# Patient Record
Sex: FEMALE | Race: WHITE | NOT HISPANIC OR LATINO | Employment: UNEMPLOYED | URBAN - METROPOLITAN AREA
[De-identification: names, ages, dates, MRNs, and addresses within clinical notes are randomized per-mention and may not be internally consistent; named-entity substitution may affect disease eponyms.]

---

## 2017-01-20 ENCOUNTER — COMMUNICATION - HEALTHEAST (OUTPATIENT)
Dept: BEHAVIORAL HEALTH | Facility: CLINIC | Age: 37
End: 2017-01-20

## 2017-01-20 DIAGNOSIS — F39 UNSPECIFIED EPISODIC MOOD DISORDER: ICD-10-CM

## 2017-04-20 ENCOUNTER — OFFICE VISIT - HEALTHEAST (OUTPATIENT)
Dept: BEHAVIORAL HEALTH | Facility: CLINIC | Age: 37
End: 2017-04-20

## 2017-04-20 DIAGNOSIS — Z86.59 H/O BORDERLINE PERSONALITY DISORDER: ICD-10-CM

## 2017-04-20 DIAGNOSIS — Z86.59 HISTORY OF POSTTRAUMATIC STRESS DISORDER (PTSD): ICD-10-CM

## 2017-04-20 DIAGNOSIS — F39 UNSPECIFIED EPISODIC MOOD DISORDER: ICD-10-CM

## 2017-04-20 DIAGNOSIS — E55.9 VITAMIN D DEFICIENCY: ICD-10-CM

## 2017-04-20 ASSESSMENT — MIFFLIN-ST. JEOR: SCORE: 2157.33

## 2017-06-04 ENCOUNTER — COMMUNICATION - HEALTHEAST (OUTPATIENT)
Dept: FAMILY MEDICINE | Facility: CLINIC | Age: 37
End: 2017-06-04

## 2017-06-23 ENCOUNTER — HOSPITAL ENCOUNTER (EMERGENCY)
Facility: CLINIC | Age: 37
Discharge: HOME | End: 2017-06-23
Attending: INTERNAL MEDICINE | Admitting: INTERNAL MEDICINE
Payer: COMMERCIAL

## 2017-06-23 VITALS
RESPIRATION RATE: 16 BRPM | WEIGHT: 293 LBS | HEART RATE: 92 BPM | SYSTOLIC BLOOD PRESSURE: 153 MMHG | TEMPERATURE: 98.8 F | BODY MASS INDEX: 55.33 KG/M2 | DIASTOLIC BLOOD PRESSURE: 97 MMHG | OXYGEN SATURATION: 99 %

## 2017-06-23 DIAGNOSIS — H10.33 ACUTE CONJUNCTIVITIS OF BOTH EYES, UNSPECIFIED ACUTE CONJUNCTIVITIS TYPE: ICD-10-CM

## 2017-06-23 PROCEDURE — 99282 EMERGENCY DEPT VISIT SF MDM: CPT | Performed by: INTERNAL MEDICINE

## 2017-06-23 PROCEDURE — 99283 EMERGENCY DEPT VISIT LOW MDM: CPT | Mod: Z6 | Performed by: INTERNAL MEDICINE

## 2017-06-23 RX ORDER — OFLOXACIN 3 MG/ML
1 SOLUTION/ DROPS OPHTHALMIC EVERY 4 HOURS
Qty: 1 BOTTLE | Refills: 0 | Status: SHIPPED | OUTPATIENT
Start: 2017-06-23 | End: 2022-03-31

## 2017-06-23 RX ORDER — PROPARACAINE HYDROCHLORIDE 5 MG/ML
1 SOLUTION/ DROPS OPHTHALMIC ONCE
Status: DISCONTINUED | OUTPATIENT
Start: 2017-06-23 | End: 2017-06-24 | Stop reason: HOSPADM

## 2017-06-23 ASSESSMENT — ENCOUNTER SYMPTOMS
EYE DISCHARGE: 1
ABDOMINAL PAIN: 0
COUGH: 0
EYE REDNESS: 1
FEVER: 0
EYE ITCHING: 0
EYE PAIN: 1

## 2017-06-23 ASSESSMENT — VISUAL ACUITY
OD: 20/25
OS: 20/30

## 2017-06-23 NOTE — ED AVS SNAPSHOT
[facility], Emergency Department    [address]    [facility] [place] [place] [address]    Phone:  [phone]    Fax:  [phone]                                       Natalie Fritsch   MRN: [id number]    Department:  [facility], Emergency Department   Date of Visit:  6/23/2017           Patient Information     Date Of Birth          1980        Your diagnoses for this visit were:     Acute conjunctivitis of both eyes, unspecified acute conjunctivitis type        You were seen by Yoel Dixon MD.        Discharge Instructions         Conjunctivitis, Nonspecific    The membrane that covers the white part of your eye (the conjunctiva) is inflamed. Inflammation happens when your body responds to an injury, allergic reaction, infection, or illness. Symptoms of inflammation in the eye may include redness, irritation, itching, swelling, or burning. These symptoms should go away within the next 24 hours. Conjunctivitis may be related to a particle that was in your eye. If so, it may wash out with your tears or irrigation treatment. Being exposed to liquid chemicals or fumes may also cause this reaction.   Home care    Apply a cold pack (ice in a plastic bag, wrapped in a towel) over the eye for 20 minutes at a time. This will reduce pain.    Artificial tears may be prescribed to reduce irritation or redness.  These should be used 3 to 4 times a day.    You may use acetaminophen or ibuprofen to control pain, unless another medicine was prescribed.(Note: If you have chronic liver or kidney disease, or if you have ever had a stomach ulcer or gastrointestinal bleeding, talk with your healthcare provider before using these medicines.)  Follow-up care  Follow up with your healthcare provider, or as advised.  When to seek medical advice  Call your healthcare provider right away if any of these occur:    Increased eyelid swelling    Increased eye pain    Increased redness or drainage from the eye    Increased blurry  vision or increased sensitivity to light    Failure of normal vision to return within 24 to 48 hours  Date Last Reviewed: 6/14/2015 2000-2017 [employer]. [address] [place] [address], [place] [address]. All rights reserved. This information is not intended as a substitute for professional medical care. Always follow your healthcare professional's instructions.      Please make an appointment to follow up with Eye Clinic (phone: [phone]) in 5-10 days if not improving.     24 Hour Appointment Hotline       To make an appointment at any [facility], call [phone]). If you don't have a family doctor or clinic, we will help you find one. [facility] clinics are conveniently located to serve the needs of you and your family.             Review of your medicines      START taking        Dose / Directions Last dose taken    ofloxacin 0.3 % ophthalmic solution   Commonly known as:  OCUFLOX   Dose:  1 drop   Quantity:  1 Bottle        Place 1 drop into both eyes every 4 hours   Refills:  0          Our records show that you are taking the medicines listed below. If these are incorrect, please call your family doctor or clinic.        Dose / Directions Last dose taken    ARIPiprazole 1 MG/ML Soln solution   Commonly known as:  ABILIFY   Dose:  10 mg        Take 10 mg by mouth daily   Refills:  0        EFFEXOR PO   Dose:  150 mg        Take 150 mg by mouth 3 times daily   Refills:  0        levonorgestrel-ethinyl estradiol per tablet   Commonly known as:  ENPRESSE   Dose:  1 tablet        Take 1 tablet by mouth daily   Refills:  0                Prescriptions were sent or printed at these locations (1 Prescription)                   Other Prescriptions                Printed at Department/Unit printer (1 of 1)         ofloxacin (OCUFLOX) 0.3 % ophthalmic solution                Orders Needing Specimen Collection     None      Pending Results     No orders found from 6/21/2017 to  "2017.            Pending Culture Results     No orders found from 2017 to 2017.            Pending Results Instructions     If you had any lab results that were not finalized at the time of your Discharge, you can call the ED Lab Result RN at [phone]. You will be contacted by this team for any positive Lab results or changes in treatment. The nurses are available 7 days a week from 10A to 6:30P.  You can leave a message 24 hours per day and they will return your call.        Thank you for choosing [facility]       Thank you for choosing [facility] for your care. Our goal is always to provide you with excellent care. Hearing back from our patients is one way we can continue to improve our services. Please take a few minutes to complete the written survey that you may receive in the mail after you visit with us. Thank you!        [employer] Information     [employer] lets you send messages to your doctor, view your test results, renew your prescriptions, schedule appointments and more. To sign up, go to [email or web address] . Click on \"Log in\" on the left side of the screen, which will take you to the Welcome page. Then click on \"Sign up Now\" on the right side of the page.     You will be asked to enter the access code listed below, as well as some personal information. Please follow the directions to create your username and password.     Your access code is: [email or web address]  Expires: 2017 10:44 PM     Your access code will  in 90 days. If you need help or a new code, please call your [facility] clinic or [phone].        Care EveryWhere ID     This is your Care EveryWhere ID. This could be used by other organizations to access your [facility] medical records  [id number]        Equal Access to Services     SHAKIRA SHARP : Darlene Longoria, david bautista, anthony brambila. So [facility] [phone].    ATENCIÓN: Si larisa lima " a hills disposición servicios gratuitos de asistencia lingüística. Llmakayla al [phone].    We comply with applicable federal civil rights laws and [place] laws. We do not discriminate on the basis of race, color, national origin, age, disability sex, sexual orientation or gender identity.            After Visit Summary       This is your record. Keep this with you and show to your community pharmacist(s) and doctor(s) at your next visit.

## 2017-06-23 NOTE — ED AVS SNAPSHOT
[facility], [place], Emergency Department    [address]    [facility] [address]    Phone:  [phone]    Fax:  [phone]                                       Natalie Fritsch   MRN: [id number]    Department:  [facility], Emergency Department   Date of Visit:  6/23/2017           After Visit Summary Signature Page     I have received my discharge instructions, and my questions have been answered. I have discussed any challenges I see with this plan with the nurse or doctor.    ..........................................................................................................................................  Patient/Patient Representative Signature      ..........................................................................................................................................  Patient Representative Print Name and Relationship to Patient    ..................................................               ................................................  Date                                            Time    ..........................................................................................................................................  Reviewed by Signature/Title    ...................................................              ..............................................  Date                                                            Time

## 2017-06-23 NOTE — LETTER
[facility], [place], EMERGENCY DEPARTMENT  [address] [place] [address]  [phone]    2017    Natalie Fritsch  [address] [place] [address]  [phone] (home)     : 1980      To Whom it may concern:    Natalie Fritsch was seen in our Emergency Department today, 2017.  I expect her condition to improve over the next 1 days.  She may return to work/school when improved.    Sincerely,        Yoel Dixon MD

## 2017-06-24 NOTE — ED PROVIDER NOTES
"  History     Chief Complaint   Patient presents with     Eye Drainage     left eye has drainage     HPI  Natalie Fritsch is a 36 year old female who presents to the ED with left eye drainage. Patient states she recently had a s/p I&D of Bartholins abscess on 06/15/17 at [facility] and just finished her course of antibiotics. She states she has eye pain in both eyes, but it's worse in the left and has seen pus come out of her left eye. She also states she sees \"white swollen dots\" in her left eye. She states her vision is a bit cloudy. She otherwise denies wearing contacts, itchy eyes,  cough, fever, chest pain, or abdominal pain.     PAST MEDICAL HISTORY  History reviewed. No pertinent past medical history.  PAST SURGICAL HISTORY  History reviewed. No pertinent surgical history.  FAMILY HISTORY  Family History   Problem Relation Age of Onset     Substance Abuse Mother      MENTAL ILLNESS Mother      Substance Abuse Father      MENTAL ILLNESS Father      Anxiety Disorder Maternal Grandmother      Substance Abuse Paternal Grandfather      SOCIAL HISTORY  Social History   Substance Use Topics     Smoking status: Current Every Day Smoker     Packs/day: 1.00     Smokeless tobacco: Not on file     Alcohol use No      Comment: rarely     MEDICATIONS  Current Facility-Administered Medications   Medication     fluorescein ophthalmic strip 1 strip     proparacaine (ALCAINE) 0.5 % ophthalmic solution 1 drop     Current Outpatient Prescriptions   Medication     ofloxacin (OCUFLOX) 0.3 % ophthalmic solution     levonorgestrel-ethinyl estradiol (ENPRESSE) per tablet     Venlafaxine HCl (EFFEXOR PO)     ARIPiprazole (ABILIFY) 1 MG/ML SOLN     ALLERGIES  Allergies   Allergen Reactions     Penicillin V          I have reviewed the Medications, Allergies, Past Medical and Surgical History, and Social History in the Epic system.    Review of Systems   Constitutional: Negative for fever.   Eyes: Positive for pain (bilateral), " discharge (left) and redness (left). Negative for itching.   Respiratory: Negative for cough.    Cardiovascular: Negative for chest pain.   Gastrointestinal: Negative for abdominal pain.   All other systems reviewed and are negative.      Physical Exam   BP: (!) 153/97  Pulse: 92  Temp: 98.8  F (37.1  C)  Resp: 16  Weight: (!) 146.3 kg (322 lb 8 oz)  SpO2: 99 %  Physical Exam   Eyes: EOM and lids are normal. Pupils are equal, round, and reactive to light. Right conjunctiva is injected. Right conjunctiva has no hemorrhage. Left conjunctiva is injected. Left conjunctiva has no hemorrhage.   Slit lamp exam:       The right eye shows no corneal abrasion, no corneal flare, no corneal ulcer, no foreign body, no hyphema, no hypopyon and no fluorescein uptake.        The left eye shows no corneal abrasion, no corneal flare, no corneal ulcer, no foreign body, no hyphema, no hypopyon and no fluorescein uptake.       ED Course     ED Course     Procedures   10:07 PM  The patient was seen and examined by Zack Earl in Room 7.                  Labs Ordered and Resulted from Time of ED Arrival Up to the Time of Departure from the ED - No data to display         Assessments & Plan (with Medical Decision Making)  Acute conjunctivitis may possibly due to EKC with fever and URI sx, will stat ocuflox and follow up with Eye Clinic if no improvements.       I have reviewed the nursing notes.    I have reviewed the findings, diagnosis, plan and need for follow up with the patient.    Discharge Medication List as of 6/23/2017 11:15 PM      START taking these medications    Details   ofloxacin (OCUFLOX) 0.3 % ophthalmic solution Place 1 drop into both eyes every 4 hours, Disp-1 Bottle, R-0, Local Print             Final diagnoses:   Acute conjunctivitis of both eyes, unspecified acute conjunctivitis type     I, James Larkin am serving as a trained medical scribe to document services personally performed by Yoel Dixon MD, based on the  provider's statements to me.      I, Yoel Dixon MD, was physically present and have reviewed and verified the accuracy of this note documented by James Larkin.     6/23/2017   [facility], [place], EMERGENCY DEPARTMENT     Yoel Dixon MD  06/23/17 1187

## 2017-06-24 NOTE — DISCHARGE INSTRUCTIONS
Conjunctivitis, Nonspecific    The membrane that covers the white part of your eye (the conjunctiva) is inflamed. Inflammation happens when your body responds to an injury, allergic reaction, infection, or illness. Symptoms of inflammation in the eye may include redness, irritation, itching, swelling, or burning. These symptoms should go away within the next 24 hours. Conjunctivitis may be related to a particle that was in your eye. If so, it may wash out with your tears or irrigation treatment. Being exposed to liquid chemicals or fumes may also cause this reaction.   Home care    Apply a cold pack (ice in a plastic bag, wrapped in a towel) over the eye for 20 minutes at a time. This will reduce pain.    Artificial tears may be prescribed to reduce irritation or redness.  These should be used 3 to 4 times a day.    You may use acetaminophen or ibuprofen to control pain, unless another medicine was prescribed.(Note: If you have chronic liver or kidney disease, or if you have ever had a stomach ulcer or gastrointestinal bleeding, talk with your healthcare provider before using these medicines.)  Follow-up care  Follow up with your healthcare provider, or as advised.  When to seek medical advice  Call your healthcare provider right away if any of these occur:    Increased eyelid swelling    Increased eye pain    Increased redness or drainage from the eye    Increased blurry vision or increased sensitivity to light    Failure of normal vision to return within 24 to 48 hours  Date Last Reviewed: 6/14/2015 2000-2017 [employer]. [address] [place] [address]. All rights reserved. This information is not intended as a substitute for professional medical care. Always follow your healthcare professional's instructions.      Please make an appointment to follow up with Eye Clinic (phone: [phone]) in 5-10 days if not improving.

## 2017-08-27 ENCOUNTER — ANESTHESIA - HEALTHEAST (OUTPATIENT)
Dept: SURGERY | Facility: HOSPITAL | Age: 37
End: 2017-08-27

## 2017-08-27 ASSESSMENT — MIFFLIN-ST. JEOR
SCORE: 2025.79
SCORE: 2168.11

## 2017-08-28 ENCOUNTER — SURGERY - HEALTHEAST (OUTPATIENT)
Dept: SURGERY | Facility: HOSPITAL | Age: 37
End: 2017-08-28

## 2017-08-28 ASSESSMENT — MIFFLIN-ST. JEOR: SCORE: 2210.86

## 2017-09-12 ENCOUNTER — OFFICE VISIT - HEALTHEAST (OUTPATIENT)
Dept: SURGERY | Facility: CLINIC | Age: 37
End: 2017-09-12

## 2017-09-12 DIAGNOSIS — Z48.89 POSTOPERATIVE VISIT: ICD-10-CM

## 2017-10-19 ENCOUNTER — OFFICE VISIT - HEALTHEAST (OUTPATIENT)
Dept: BEHAVIORAL HEALTH | Facility: CLINIC | Age: 37
End: 2017-10-19

## 2017-10-19 DIAGNOSIS — G89.29 CHRONIC BILATERAL THORACIC BACK PAIN: ICD-10-CM

## 2017-10-19 DIAGNOSIS — F32.A DEPRESSION (EMOTION): ICD-10-CM

## 2017-10-19 DIAGNOSIS — E55.9 VITAMIN D DEFICIENCY: ICD-10-CM

## 2017-10-19 DIAGNOSIS — R63.8 INCREASED BMI: ICD-10-CM

## 2017-10-19 DIAGNOSIS — F43.0 ACUTE REACTION TO STRESS: ICD-10-CM

## 2017-10-19 DIAGNOSIS — Z86.59 HISTORY OF POSTTRAUMATIC STRESS DISORDER (PTSD): ICD-10-CM

## 2017-10-19 DIAGNOSIS — F41.1 ANXIETY STATE: ICD-10-CM

## 2017-10-19 DIAGNOSIS — M54.6 CHRONIC BILATERAL THORACIC BACK PAIN: ICD-10-CM

## 2017-10-19 ASSESSMENT — MIFFLIN-ST. JEOR: SCORE: 2193.62

## 2017-11-07 ENCOUNTER — COMMUNICATION - HEALTHEAST (OUTPATIENT)
Dept: FAMILY MEDICINE | Facility: CLINIC | Age: 37
End: 2017-11-07

## 2017-11-07 ENCOUNTER — COMMUNICATION - HEALTHEAST (OUTPATIENT)
Dept: BEHAVIORAL HEALTH | Facility: CLINIC | Age: 37
End: 2017-11-07

## 2017-11-07 DIAGNOSIS — F39 EPISODIC MOOD DISORDER (H): ICD-10-CM

## 2017-11-07 DIAGNOSIS — F41.1 ANXIETY STATE: ICD-10-CM

## 2017-11-27 ENCOUNTER — OFFICE VISIT - HEALTHEAST (OUTPATIENT)
Dept: FAMILY MEDICINE | Facility: CLINIC | Age: 37
End: 2017-11-27

## 2017-11-27 DIAGNOSIS — Z30.09 CONTRACEPTIVE USE EDUCATION: ICD-10-CM

## 2017-11-27 DIAGNOSIS — Z00.00 ANNUAL PHYSICAL EXAM: ICD-10-CM

## 2017-11-27 DIAGNOSIS — Z11.3 SCREENING FOR STD (SEXUALLY TRANSMITTED DISEASE): ICD-10-CM

## 2017-11-27 LAB — HIV 1+2 AB+HIV1 P24 AG SERPL QL IA: NEGATIVE

## 2017-11-27 ASSESSMENT — MIFFLIN-ST. JEOR: SCORE: 2237.5

## 2017-11-28 LAB — SYPHILIS RPR SCREEN - HISTORICAL: NORMAL

## 2017-12-18 ENCOUNTER — COMMUNICATION - HEALTHEAST (OUTPATIENT)
Dept: BEHAVIORAL HEALTH | Facility: CLINIC | Age: 37
End: 2017-12-18

## 2017-12-18 DIAGNOSIS — F39 EPISODIC MOOD DISORDER (H): ICD-10-CM

## 2017-12-18 DIAGNOSIS — F41.1 ANXIETY STATE: ICD-10-CM

## 2018-01-03 ENCOUNTER — COMMUNICATION - HEALTHEAST (OUTPATIENT)
Dept: BEHAVIORAL HEALTH | Facility: CLINIC | Age: 38
End: 2018-01-03

## 2018-01-19 ENCOUNTER — OFFICE VISIT - HEALTHEAST (OUTPATIENT)
Dept: BEHAVIORAL HEALTH | Facility: CLINIC | Age: 38
End: 2018-01-19

## 2018-01-19 DIAGNOSIS — E55.9 VITAMIN D DEFICIENCY: ICD-10-CM

## 2018-01-19 DIAGNOSIS — F41.1 ANXIETY STATE: ICD-10-CM

## 2018-01-19 DIAGNOSIS — F33.41 RECURRENT MAJOR DEPRESSIVE DISORDER, IN PARTIAL REMISSION (H): ICD-10-CM

## 2018-01-19 DIAGNOSIS — Z86.59 HISTORY OF POSTTRAUMATIC STRESS DISORDER (PTSD): ICD-10-CM

## 2018-01-19 ASSESSMENT — MIFFLIN-ST. JEOR: SCORE: 2273.73

## 2018-02-20 ENCOUNTER — AMBULATORY - HEALTHEAST (OUTPATIENT)
Dept: BEHAVIORAL HEALTH | Facility: CLINIC | Age: 38
End: 2018-02-20

## 2018-02-20 DIAGNOSIS — F33.41 RECURRENT MAJOR DEPRESSIVE DISORDER, IN PARTIAL REMISSION (H): ICD-10-CM

## 2018-02-20 DIAGNOSIS — Z86.59 HISTORY OF POSTTRAUMATIC STRESS DISORDER (PTSD): ICD-10-CM

## 2018-02-20 DIAGNOSIS — F41.1 ANXIETY STATE: ICD-10-CM

## 2018-05-04 ENCOUNTER — OFFICE VISIT - HEALTHEAST (OUTPATIENT)
Dept: BEHAVIORAL HEALTH | Facility: CLINIC | Age: 38
End: 2018-05-04

## 2018-05-04 DIAGNOSIS — Z86.59 HISTORY OF POSTTRAUMATIC STRESS DISORDER (PTSD): ICD-10-CM

## 2018-05-04 DIAGNOSIS — F41.1 ANXIETY STATE: ICD-10-CM

## 2018-05-04 DIAGNOSIS — F33.41 RECURRENT MAJOR DEPRESSIVE DISORDER, IN PARTIAL REMISSION (H): ICD-10-CM

## 2018-05-04 DIAGNOSIS — R63.8 INCREASED BMI: ICD-10-CM

## 2018-05-04 ASSESSMENT — MIFFLIN-ST. JEOR: SCORE: 2278.27

## 2018-05-26 ENCOUNTER — OFFICE VISIT - HEALTHEAST (OUTPATIENT)
Dept: FAMILY MEDICINE | Facility: CLINIC | Age: 38
End: 2018-05-26

## 2018-05-26 DIAGNOSIS — I88.9 LYMPHADENITIS: ICD-10-CM

## 2018-05-26 DIAGNOSIS — J02.9 SORE THROAT: ICD-10-CM

## 2018-05-26 DIAGNOSIS — J03.90 TONSILLITIS: ICD-10-CM

## 2018-05-26 LAB — DEPRECATED S PYO AG THROAT QL EIA: NORMAL

## 2018-05-27 LAB — GROUP A STREP BY PCR: NORMAL

## 2018-06-08 ENCOUNTER — COMMUNICATION - HEALTHEAST (OUTPATIENT)
Dept: BEHAVIORAL HEALTH | Facility: CLINIC | Age: 38
End: 2018-06-08

## 2018-06-14 ENCOUNTER — OFFICE VISIT - HEALTHEAST (OUTPATIENT)
Dept: BEHAVIORAL HEALTH | Facility: CLINIC | Age: 38
End: 2018-06-14

## 2018-06-14 DIAGNOSIS — Z86.59 HISTORY OF POSTTRAUMATIC STRESS DISORDER (PTSD): ICD-10-CM

## 2018-06-14 DIAGNOSIS — Z63.9 RELATIONSHIP PROBLEMS: ICD-10-CM

## 2018-06-14 DIAGNOSIS — F33.41 RECURRENT MAJOR DEPRESSIVE DISORDER, IN PARTIAL REMISSION (H): ICD-10-CM

## 2018-06-14 DIAGNOSIS — R51.9 HEADACHE: ICD-10-CM

## 2018-06-14 DIAGNOSIS — R63.8 INCREASED BMI: ICD-10-CM

## 2018-06-14 DIAGNOSIS — F41.1 ANXIETY STATE: ICD-10-CM

## 2018-06-14 SDOH — SOCIAL STABILITY - SOCIAL INSECURITY: PROBLEM RELATED TO PRIMARY SUPPORT GROUP, UNSPECIFIED: Z63.9

## 2018-08-24 ENCOUNTER — COMMUNICATION - HEALTHEAST (OUTPATIENT)
Dept: BEHAVIORAL HEALTH | Facility: CLINIC | Age: 38
End: 2018-08-24

## 2018-08-24 DIAGNOSIS — F32.9 MDD (MAJOR DEPRESSIVE DISORDER): ICD-10-CM

## 2019-06-08 ENCOUNTER — COMMUNICATION - HEALTHEAST (OUTPATIENT)
Dept: TELEHEALTH | Facility: CLINIC | Age: 39
End: 2019-06-08

## 2019-06-08 ENCOUNTER — OFFICE VISIT - HEALTHEAST (OUTPATIENT)
Dept: FAMILY MEDICINE | Facility: CLINIC | Age: 39
End: 2019-06-08

## 2019-06-08 DIAGNOSIS — M75.81 TENDINITIS OF RIGHT INFRASPINATUS TENDON: ICD-10-CM

## 2019-06-08 DIAGNOSIS — R60.0 LOWER EXTREMITY EDEMA: ICD-10-CM

## 2019-06-08 DIAGNOSIS — B96.89 BV (BACTERIAL VAGINOSIS): ICD-10-CM

## 2019-06-08 DIAGNOSIS — R30.0 DYSURIA: ICD-10-CM

## 2019-06-08 DIAGNOSIS — N76.0 BV (BACTERIAL VAGINOSIS): ICD-10-CM

## 2019-06-08 DIAGNOSIS — N89.8 VAGINAL DISCHARGE: ICD-10-CM

## 2019-06-08 LAB
ALBUMIN UR-MCNC: ABNORMAL MG/DL
APPEARANCE UR: CLEAR
BACTERIA #/AREA URNS HPF: ABNORMAL HPF
BILIRUB UR QL STRIP: ABNORMAL
CLUE CELLS: ABNORMAL
COLOR UR AUTO: YELLOW
GLUCOSE UR STRIP-MCNC: NEGATIVE MG/DL
HGB UR QL STRIP: ABNORMAL
KETONES UR STRIP-MCNC: NEGATIVE MG/DL
LEUKOCYTE ESTERASE UR QL STRIP: NEGATIVE
MUCOUS THREADS #/AREA URNS LPF: ABNORMAL LPF
NITRATE UR QL: NEGATIVE
PH UR STRIP: 5.5 [PH] (ref 5–8)
RBC #/AREA URNS AUTO: ABNORMAL HPF
SP GR UR STRIP: >=1.03 (ref 1–1.03)
SQUAMOUS #/AREA URNS AUTO: ABNORMAL LPF
TRICHOMONAS, WET PREP: ABNORMAL
UROBILINOGEN UR STRIP-ACNC: ABNORMAL
WBC #/AREA URNS AUTO: ABNORMAL HPF
YEAST, WET PREP: ABNORMAL

## 2019-06-11 ENCOUNTER — COMMUNICATION - HEALTHEAST (OUTPATIENT)
Dept: FAMILY MEDICINE | Facility: CLINIC | Age: 39
End: 2019-06-11

## 2019-06-11 DIAGNOSIS — A74.9 CHLAMYDIA: ICD-10-CM

## 2019-06-11 LAB
C TRACH DNA SPEC QL PROBE+SIG AMP: POSITIVE
N GONORRHOEA DNA SPEC QL NAA+PROBE: NEGATIVE

## 2019-06-12 ENCOUNTER — COMMUNICATION - HEALTHEAST (OUTPATIENT)
Dept: FAMILY MEDICINE | Facility: CLINIC | Age: 39
End: 2019-06-12

## 2019-07-09 ENCOUNTER — RECORDS - HEALTHEAST (OUTPATIENT)
Dept: SCHEDULING | Facility: CLINIC | Age: 39
End: 2019-07-09

## 2019-07-09 ENCOUNTER — COMMUNICATION - HEALTHEAST (OUTPATIENT)
Dept: SCHEDULING | Facility: CLINIC | Age: 39
End: 2019-07-09

## 2019-12-27 ENCOUNTER — OFFICE VISIT - HEALTHEAST (OUTPATIENT)
Dept: FAMILY MEDICINE | Facility: CLINIC | Age: 39
End: 2019-12-27

## 2019-12-27 DIAGNOSIS — M79.10 MYALGIA: ICD-10-CM

## 2019-12-27 DIAGNOSIS — B34.9 VIRAL SYNDROME: ICD-10-CM

## 2019-12-27 LAB
FLUAV AG SPEC QL IA: NORMAL
FLUBV AG SPEC QL IA: NORMAL

## 2019-12-30 ENCOUNTER — COMMUNICATION - HEALTHEAST (OUTPATIENT)
Dept: SCHEDULING | Facility: CLINIC | Age: 39
End: 2019-12-30

## 2021-05-25 ENCOUNTER — RECORDS - HEALTHEAST (OUTPATIENT)
Dept: ADMINISTRATIVE | Facility: CLINIC | Age: 41
End: 2021-05-25

## 2021-05-26 ENCOUNTER — RECORDS - HEALTHEAST (OUTPATIENT)
Dept: ADMINISTRATIVE | Facility: CLINIC | Age: 41
End: 2021-05-26

## 2021-05-29 NOTE — TELEPHONE ENCOUNTER
Positive chlamydia.  Attempted to call patient to inform, left message to call back. Prescription for one dose azithromycin sent to pharmacy.  No sexual contact for one week following dose of medication.  Encourage partner treatment, safe sex.  If no improvement in discharge after medication, should follow up for test of cure. If fevers, abdominal pain develop, should be seen again immediately.

## 2021-05-29 NOTE — PATIENT INSTRUCTIONS - HE
Left message for patient to return call. Jayashree can work patient in after 4pm.    Can work her in after 4 pm     Jayashree Nunez CNP (Routing comment)     Madie Mirza   Clinic Station       Leg swelling:  Compression stockings, salt (sodium) <2 grams per day, frequent feet elevation     Shoulder pain:  Naproxen twice daily for 1 to 2 weeks.  If no improvement at PCP follow-up would recommend physical therapy referral.    Vaginal discharge:  Flagyl antibiotic for bacterial vaginosis  We will call you with the gonorrhea/chlaymdia results

## 2021-05-29 NOTE — PROGRESS NOTES
Subjective:   Natalie J Fritsch is a 38 y.o. year old female who presents to clinic today for the following health issues:    Chief Complaint   Patient presents with     Shoulder Pain     [id number], R shoulder, pain is getting worse, don't remember injuring it     Leg Swelling     x3-4d, ankle and foot swelling, bilateral but worse on R side     Vaginal Discharge     x1wk, burns when urinating     Right shoulder pain:  2-month history of progressive right shoulder pain.  No inciting event or trauma.  Merely in the anterior shoulder.  Exacerbated by cross body activities.  Has been using 800 mg ibuprofen approximately once per day which helps somewhat.  No neck pain.    Leg swelling:  Noticed 3 to 4 days ago after spending a long day at work standing and ambulating.  Resolved with leg elevation.  No recent changes in diet or salt intake.  Has not used compression stockings in the past.  She denies chest pain, palpitations, orthopnea.  No personal history of cardiac events.    Vaginal discharge:  2-week history of white/yellow thin vaginal discharge after intercourse with a new sexual partner.  Does have history of BV in the past, states this discharge has a similar odor.  No vulvar pruritus but does have some vaginal discomfort.  Also notes incomplete bladder emptying and some dysuria.  No new back pain, no abdominal pain, fevers or chills.  Has Nexplanon in place for contraception.         PMHX:   PAST MEDICAL HISTORY:  Patient Active Problem List   Diagnosis     Vitamin D Deficiency     Acute reaction to stress     Anxiety     Headache     Increased BMI     Contraception - pills     Thoracic back pain     History of posttraumatic stress disorder (PTSD)     S/P cholecystectomy     Recurrent major depressive disorder, in partial remission (H)       CURRENT MEDICATIONS:  Current Outpatient Medications   Medication Sig Dispense Refill     ABILIFY 10 mg tablet TAKE 1 TABLET BY MOUTH DAILY 30 tablet 0      venlafaxine (EFFEXOR-XR) 150 MG 24 hr capsule TAKE 1 CAPSULE BY MOUTH ONCE DAILY. 90 capsule 3     cholecalciferol, vitamin D3, 50,000 unit capsule One weekly for 6 months 13 capsule 1     naproxen (NAPROSYN) 500 MG tablet Take 1 tablet (500 mg total) by mouth 2 (two) times a day with meals. For 1-2 week than as needed 60 tablet 2     predniSONE (DELTASONE) 20 MG tablet 40mg daily for 3 days 6 tablet 0     No current facility-administered medications for this visit.        ALLERGIES:  Allergies   Allergen Reactions     Cat Hair Std Allergenic Ext      Penicillins Hives            Objective:     Vitals:    06/08/19 1251   BP: 115/82   Patient Site: Right Arm   Patient Position: Sitting   Cuff Size: Adult Large   Pulse: 95   Resp: 16   Temp: 98.2  F (36.8  C)   TempSrc: Oral   SpO2: 95%   Weight: (!) 380 lb 9 oz (172.6 kg)     Body mass index is 63.8 kg/m .    General: No acute distress  Cardiovascular: regular rate and rhythm with no murmur  Pulmonary: clear to auscultation bilaterally with no wheeze  Abdomen: obese, soft, nontender  Back: No CVA tenderness  Extremities: warm and well perfused with no lower extremity edema today. Right shoulder: No tenderness to palpation of SC/AC joint, clavicle, trapezius.  Full range of motion with flexion/extension, slightly decreased range of motion with internal rotation, rotator cuff exam positive for pain and 4.5/5 strength abduction and external rotation.  Negative empty can, Yergason's.  GYN: Normal-appearing cervix, thin white discharge in vaginal vault  Psych: Appropriate affect, memory intact    Assessment and Plan:   1. Vaginal discharge  2-week history of vaginal discharge after sexual encounter with new partner.  Wet prep consistent with bacterial vaginosis.  Flagyl prescription sent to pharmacy.  GC/chlamydia also sent.  - Wet Prep, Vaginal  - Chlamydia trachomatis & Neisseria gonorrhoeae, Amplified Detection  - metroNIDAZOLE (FLAGYL) 500 MG tablet; Take 1 tablet  (500 mg total) by mouth 2 (two) times a day for 7 days. No alcohol while on this medication.  Dispense: 14 tablet; Refill: 0    2. Dysuria  UA dirty sample but does not appear infected.  - Urinalysis-UC if Indicated    3. Tendinitis of right infraspinatus tendon  Gradual worsening of right shoulder pain without inciting event.  Exam significant for slightly decreased strength with abduction and external rotation consistent with infraspinatus tendon irritation.  Will trial 1 to 2-week course of naproxen.  Patient has follow-up with new PCP on 6/22/2019, discussed if no improvement would recommend PT referral at that time.  - naproxen (NAPROSYN) 500 MG tablet; Take 1 tablet (500 mg total) by mouth 2 (two) times a day with meals. For 1-2 week than as needed  Dispense: 60 tablet; Refill: 2    4. Lower extremity edema  Nothing on history or exam to suggest cardiac cause.  Discussed using compression stockings, decreasing salt intake, frequent feet elevation.       Naomi Smith, DO

## 2021-05-29 NOTE — PROGRESS NOTES
Subjective:   Natalie J Fritsch is a 38 y.o. year old female who presents to clinic today for the following health issues:    Chief Complaint   Patient presents with     Shoulder Pain     [id number], R shoulder, pain is getting worse, don't remember injuring it     Leg Swelling     x3-4d, ankle and foot swelling, bilateral but worse on R side     Vaginal Discharge     x1wk, burns when urinating       Right shoulder:  2 months duration. Front shoulder. Works as counselor. Gotten worse over time. Bad the past 2 weeks. Dull ache. Worse with sleep. Ibuprofen prn not doing much. 800mg once a day. No ice, heat, PT.    Leg swellin weeks ago. R>L. Works 3 jobs. Noticed after 3 long shits. Put feet up and resolved. No changes  In diet. No cp, shortness of breath at rest, no orthopnea. No prior history of cardiac disease. Smokes 1/2 ppd.     Vaginal discharge:  2 weeks, discharge after new sexual partner. Prior history of BV, odor smells similar. Yellow/white thin dicsharge. Some vaginal burning. Some burning with urination and incomplete emptying. No abdominal pain. Low back for months. Has nexplanon.       REVIEW OF SYSTEMS:   All other systems are negative.         PMHX:   PAST MEDICAL HISTORY:  Patient Active Problem List   Diagnosis     Vitamin D Deficiency     Acute reaction to stress     Anxiety     Headache     Increased BMI     Contraception - pills     Thoracic back pain     History of posttraumatic stress disorder (PTSD)     S/P cholecystectomy     Recurrent major depressive disorder, in partial remission (H)       CURRENT MEDICATIONS:  Current Outpatient Medications   Medication Sig Dispense Refill     ABILIFY 10 mg tablet TAKE 1 TABLET BY MOUTH DAILY 30 tablet 0     venlafaxine (EFFEXOR-XR) 150 MG 24 hr capsule TAKE 1 CAPSULE BY MOUTH ONCE DAILY. 90 capsule 3     cholecalciferol, vitamin D3, 50,000 unit capsule One weekly for 6 months 13 capsule 1     predniSONE (DELTASONE) 20 MG tablet 40mg daily for 3  days 6 tablet 0     No current facility-administered medications for this visit.        ALLERGIES:  Allergies   Allergen Reactions     Cat Hair Std Allergenic Ext      Penicillins Hives              Objective:     Vitals:    06/08/19 1251   BP: 115/82   Patient Site: Right Arm   Patient Position: Sitting   Cuff Size: Adult Large   Pulse: 95   Resp: 16   Temp: 98.2  F (36.8  C)   TempSrc: Oral   SpO2: 95%   Weight: (!) 380 lb 9 oz (172.6 kg)     Body mass index is 63.8 kg/m .    General: No acute distress  HEENT: moist mucous membranes, pupils equal, round, reactive to light and accomodation, posterior oropharynx clear of erythema or exudate, tympanic membranes are pearly grey bilaterally  Lymph: no cervical or supraclavicular lymphadenopathy  Cardiovascular: regular rate and rhythm with no murmur  Pulmonary: clear to auscultation bilaterally with no wheeze  Abdomen: soft, non tender, non distended with normo-active bowel sounds  Extremities: warm and well perfused with no edema  Skin: warm and dry with no rash  Psych: Appropriate affect, memory intact    Assessment and Plan:   There are no diagnoses linked to this encounter.         Naomi Smith DO

## 2021-05-29 NOTE — TELEPHONE ENCOUNTER
Called pharmacy back and confirmed with Dr. Anderson that patient is to take 1000 mg total of Azithromycin one time.  Pharmacy will fill correct dose.

## 2021-05-29 NOTE — TELEPHONE ENCOUNTER
Patient Returning Call  Reason for call:  Message from clinic  Information relayed to patient:Positive chlamydia.  Attempted to call patient to inform, left message to call back. Prescription for one dose azithromycin sent to pharmacy.  No sexual contact for one week following dose of medication.  Encourage partner treatment, safe sex.  If no improvement in discharge after medication, should follow up for test of cure. If fevers, abdominal pain develop, should be seen again immediately.  Patient has additional questions:  No  If YES, what are your questions/concerns:  n/a  Okay to leave a detailed message?: No call back needed

## 2021-05-29 NOTE — TELEPHONE ENCOUNTER
Left message for patient to contact the clinic.  Please transfer call to walk in care when patient calls back. Thank you.

## 2021-05-29 NOTE — TELEPHONE ENCOUNTER
Medication Question or Clarification  Who is calling: Pharmacy: [facility]  What medication are you calling about? (include dose and sig) azithromycin (ZITHROMAX) 500 MG tablet  Who prescribed the medication?: Britany Wilson CNP  What is your question/concern?: [facility] states the prescription was sent over with 2 different directions. [facility] is requesting a call back with the correct directions. Please advise, thank you.  Pharmacy: [facility] [place], [place]  Okay to leave a detailed message?: No  Site CMT - Please call the pharmacy to obtain any additional needed information.

## 2021-05-29 NOTE — TELEPHONE ENCOUNTER
----- Message from Britany Wilson CNP sent at 6/11/2019  2:16 PM CDT -----  Left message for patient to return call.  See telephone encounter dated today.  If no return call, please try to call again.

## 2021-05-30 ENCOUNTER — RECORDS - HEALTHEAST (OUTPATIENT)
Dept: ADMINISTRATIVE | Facility: CLINIC | Age: 41
End: 2021-05-30

## 2021-05-30 VITALS — BODY MASS INDEX: 50.02 KG/M2 | HEIGHT: 64 IN | WEIGHT: 293 LBS

## 2021-05-30 NOTE — TELEPHONE ENCOUNTER
Triage call: No PCP within [facility]- unable to route    Seen 6/8/19 Chlamydia and BV- antibiotics prescribed by a [facility] physician- patient is requesting those antibiotics be re-prescribed.      Patient states that she is still having symptoms- vaginal burning, discharge- yellow/white discharge. Fowl/fishy odor, feels like she is not able to fully empty her bladder and burning when she urinates , denies abdominal pain, denies fever.     Triaged to be seen in the office within the next 3 days- advised patient to go back to the [facility] to be seen today. Patient will go back into be seen.     Coco Schofield RN BSBA Care Connection Triage/Med Refill 7/9/2019 10:45 AM     Reason for Disposition    Bad smelling vaginal discharge    Protocols used: VAGINAL [id number]

## 2021-05-31 VITALS — BODY MASS INDEX: 48.82 KG/M2 | HEIGHT: 65 IN | WEIGHT: 293 LBS

## 2021-05-31 VITALS — WEIGHT: 293 LBS | HEIGHT: 65 IN | BODY MASS INDEX: 48.82 KG/M2

## 2021-05-31 VITALS — HEIGHT: 64 IN | BODY MASS INDEX: 50.02 KG/M2 | WEIGHT: 293 LBS

## 2021-05-31 VITALS — HEIGHT: 64 IN | WEIGHT: 293 LBS | BODY MASS INDEX: 50.02 KG/M2

## 2021-06-01 VITALS — WEIGHT: 293 LBS | BODY MASS INDEX: 58.84 KG/M2

## 2021-06-01 VITALS — HEIGHT: 65 IN | WEIGHT: 293 LBS | BODY MASS INDEX: 48.82 KG/M2

## 2021-06-02 ENCOUNTER — RECORDS - HEALTHEAST (OUTPATIENT)
Dept: ADMINISTRATIVE | Facility: CLINIC | Age: 41
End: 2021-06-02

## 2021-06-03 VITALS — BODY MASS INDEX: 63.8 KG/M2 | WEIGHT: 293 LBS

## 2021-06-04 VITALS
HEART RATE: 95 BPM | BODY MASS INDEX: 64.41 KG/M2 | WEIGHT: 293 LBS | TEMPERATURE: 98.4 F | OXYGEN SATURATION: 96 % | SYSTOLIC BLOOD PRESSURE: 113 MMHG | RESPIRATION RATE: 14 BRPM | DIASTOLIC BLOOD PRESSURE: 81 MMHG

## 2021-06-04 NOTE — TELEPHONE ENCOUNTER
Pt called to discuss new onset of fever. Pt has multi health concerns and has been seen 2 times in past 3 days for medical attention. Pt was told at the first sign of fever to go to ED. Pt does not have a thermometer but her forehead is very hot to touch. Per protocol suggested she seek medical care as advised earlier. Pt agreed with plan  Victoria Shaw RN Care Connection Triage/Medication Refill      Reason for Disposition    [1] Fever > 100.0 F (37.8 C) AND [2] diabetes mellitus or weak immune system (e.g., HIV positive, cancer chemo, splenectomy, chronic steroids)    Protocols used: FEVER-A-AH

## 2021-06-10 NOTE — PROGRESS NOTES
Mental Health and Addiction Medicine  Outpatient Subsequent Visit  By Radha Rosa M.D.    4/20/2017    Natalie J Fritsch, 1980.    This note was created by undersigned using a dictation system. All typing errors or contextual distortion is unintentional and software inherent.      Chief Complaint:  Patient was seen today in follow-up for  Chief Complaint   Patient presents with     Follow-up unspecified episodic mood disorder      Impression/Medical Decision-Making:  Natalie J Fritsch is treated for an unspecified episodic mood disorder.  She uses venlafaxine and Abilify, generally with good success.  We had some discussion about her disappointment in herself and her internal .  Also discussed issues with spending and recommended that she look into Debtors Anonymous.  There about 5 or 6 meetings in the cities.  It can be helpful if one overspends or under spends or struggles with self worth and its connection to money.  Cathi states she keeps meaning to make a budget but I shared that in DA they did speak of it as a spending plan because spending money on your self is an important part of recovery from debting or overspending or being disconnected from what one he needs to spend for good self care.  We did look up the available meetings.  I would say that Cathi can follow up in 6 months unless there are problems she can come sooner call the pharmacy for any needed refills.  Will see PCP soon asked her to get a Vitamin D level    Diagnoses/Plan:  Cathi was seen today for follow-up.    Diagnoses and all orders for this visit:    Unspecified episodic mood disorder  -     Discontinue: ARIPiprazole (ABILIFY) 10 MG tablet; TAKE 1 TABLET BY MOUTH DAILY  -     ARIPiprazole (ABILIFY) 10 MG tablet; TAKE 1 TABLET BY MOUTH DAILY    Vitamin D deficiency    H/O borderline personality disorder    History of posttraumatic stress disorder (PTSD)    Significant Interval History:   Natalie J Fritsch   Presents for  follow-up.  She is working a lot of hours.  Feeling more comfortable at [employer].  Not liking to work the evening shift because it leaves her little time to get together with friends.  She is working full-time evening shifts at [employer] and then every other weekend at [employer].  She is currently living out in [place] where the rents are less because she is trying to pay off some of her bills as well as chunks of her student loans.  She has been paying more attention to what she is eating and doing a bit of walking and has lost about 20 pounds.  Her mother is been after her to potentially buy a house in [place] and would perhaps provide some support.  Cathi admits she does not always  handle her money well.  Struggling with a little bit of disappointment in herself by not following through with actually getting a doctorate.  Not completely sure that she wants to be doing exactly what she is doing.  Easy to feels somewhat judged by her mother.  Turned out she was able to get a significant discount on her Abilify when it was dispensed as written and then used with a Good Rx card.  No problems with sleep mood generally good except for her nagging disappointment.    Current Stressors: economic problems, occupational problems and problems related to social environment    Addiction History:denies    Active Problem List:  Patient Active Problem List   Diagnosis     Vitamin D Deficiency     Glucose Intolerance     Fatigue     Abnormal Pap smear of cervix     Anxiety     Headache     Morbid Obesity     Contraception - pills     Thoracic back pain     History of posttraumatic stress disorder (PTSD)     H/O borderline personality disorder       Allergies as of 04/20/2017 - Jose as Reviewed 04/20/2017   Allergen Reaction Noted     Cat hair std allergenic ext  05/01/2009     Penicillins Hives 10/02/2008       Current Outpatient Prescriptions   Medication Sig Dispense Refill     ARIPiprazole (ABILIFY) 10 MG tablet TAKE 1 TABLET BY  "MOUTH DAILY 30 tablet 5     levonorgestrel-ethinyl estradiol (AVIANE,ALESSE,LESSINA) 0.1-20 mg-mcg per tablet Take one pill daily. 90 tablet 3     venlafaxine (EFFEXOR-XR) 150 MG 24 hr capsule Take 1 capsule (150 mg total) by mouth daily. 30 capsule 11     cyclobenzaprine (FLEXERIL) 5 MG tablet Take 1 tablet (5 mg total) by mouth 3 (three) times a day as needed for muscle spasms. 40 tablet 1     No current facility-administered medications for this visit.          Medication Side Effects:  None    Clinincal Outcome Measures:  PHQ-9   4  BRIGID-7   2  MDQCurrent:  0      [place] Prescription Monitoring Program:  No worrisome pharmacy activity.    Objective:     Vitals:    04/20/17 1314   BP: 127/79   Pulse: 87   Weight: (!) 329 lb (149.2 kg)   Height: 5' 4\" (1.626 m)     Body mass index is 56.47 kg/(m^2).    General appearance: normal, increased BMI   Level of consciousness: alert   Gait/Station: Normal   Muscle Strength/Tone: No gross abnormalities   Postural tremor in the upper extremities: None   Cogwheel rigidity at the elbow or wrists:absent   Point-to-point maneuvers: normal   Abnormal motor movements:None noted       Psychiatric Mental Status Examination   Appearance/Grooming, dress and hygiene:Normal   Consciousness/Orientation: Normal, A+O to name, date, place and situation   Behavior/Attitude:Cooperative   Mood: Euthymic:  Friendly and Pleasant, Dysphoric:  Remorseful, Sad and at times and Apprehensive:   rarely  Affect:   Range -> Full   Intensity -> normal   Eye contact: within normal limits   Speech: Normal   Thought processes: Normal  Thought content:Normal, denies suicidal homicidal ideation, denies auditory and visual hallucinations  Perceptions:Normal  Insight and judgement: Recognition of illness, Taking steps to manage illness and Readiness to change takes time but interested  Memory-Recent: Normal   Memory-Remote: Normal   Attention/Concentration: Normal   Language: Normal   Fund of Knowledge: " above average for current situation    Summary of Diagnostic Studies:    Review indicates:  Urine toxicology is not indicated for this patient.  Needs vitamin D and lipid panel and glucose    Discharge Planning:    Reviewed risks/benefits of medication and Patient able to verbalize understanding of side effects   Sounds like DA would be helpful    Ask PCP to forward labs    Total Time:   15  minutes                  Coordination of Care:      15  minutes spent on this visit with more than 50% time spent face to face in education about diagnosis and treatment options, counseling and support as well as coordination of care with staff.  Provided a referral for lab work. Time also spent on entering orders and preparing documentation for the visit.       Radha Rosa M.D.  Certified in Addiction and Family Medicine  [facility]

## 2021-06-10 NOTE — PROGRESS NOTES
Pt here for follow up.    Depression PHQ-9 Score 4 somewhat difficult  Anxiety BRIGID-7 Score  2 somewhat difficult  Sleep ranging from 8-12 hours, working evening  Appetite  No changes     SI/HI thoughts: denies at this time    [place]  below:  None found    Correct pharmacy verified with patient and confirmed in snapshot? [x] yes []no    Medications Phoned  to Pharmacy [] yes [x]no  Name of Pharmacist:  List Medications, including dose, quantity and instructions      Medication Prescriptions given to patient   [] yes  [x] no   List the name of the drug the prescription was written for.       Medications ordered this visit were e-scribed.  Verified by order class [x] yes  [] no   Abilify  Medication changes or discontinuations were communicated to patient's pharmacy: [] yes  [x] no    UA collected [] yes    [x] no    [place] Prescription Monitoring Program Reviewed? [x] yes  [] no    Referrals were made to:  none    Future appointment was made: [x] yes  [] no    Dictation completed at time of chart check: [] yes  [x] no    I have checked the documentation for today s encounters and the above information has been reviewed and completed.

## 2021-06-12 NOTE — PROGRESS NOTES
HPI: Pt is here for follow up of a lap cipriano.   she is doing well.  Pain is well controlled.  No difficulties with the surgical wound/wounds.  She does feel really tired. Chills and gets hot with sweats. Never runs a fever. No cough, chest pain. Sore ruq. Intermittent diarrhea with constipation    /88 (Patient Site: Left Arm, Patient Position: Sitting, Cuff Size: Thigh)  Pulse (!) 101  Temp 97.6  F (36.4  C)  LMP 08/13/2017 (Approximate)  SpO2 99%  Breastfeeding? No    EXAM:  GENERAL:Appears well  ABDOMEN:  Soft, +BS  SURGICAL WOUNDS:  Incisions healing well, no enduration or drainage.tender upper port.     .lastlab[CASEREPORT    Assessment/Plan: . Doing well after surgery and should follow up as needed. Not sure why she feels lousy other than maybe healing process with viral syndrome. Discussed if symptoms worsen to be seen  Zulay Sandy PA-C  [facility] Department of Surgery

## 2021-06-12 NOTE — ANESTHESIA POSTPROCEDURE EVALUATION
Patient: Natalie J Fritsch  CHOLECYSTECTOMY, LAPAROSCOPIC  Anesthesia type: general    Patient location: PACU  Last vitals:   Vitals:    08/28/17 1000   BP: 103/55   Pulse: 81   Resp: 18   Temp: 36.9  C (98.4  F)   SpO2: 97%     Post vital signs: stable  Level of consciousness: lethargic and responds to simple questions  Post-anesthesia pain: pain controlled  Post-anesthesia nausea and vomiting: no  Pulmonary: unassisted  Cardiovascular: stable  Hydration: adequate  Anesthetic events: no    QCDR Measures:  ASA# 11 - Kaylan-op Cardiac Arrest: ASA11B - Patient did NOT experience unanticipated cardiac arrest  ASA# 12 - Kaylan-op Mortality Rate: ASA12B - Patient did NOT die  ASA# 13 - PACU Re-Intubation Rate: ASA13B - Patient did NOT require a new airway mgmt  ASA# 10 - Composite Anes Safety: ASA10A - No serious adverse event  ASA# 38 - New Corneal Injury: ASA38A - No new exposure keratitis or corneal abrasion in PACU    Additional Notes:

## 2021-06-12 NOTE — ANESTHESIA PREPROCEDURE EVALUATION
Anesthesia Evaluation      Patient summary reviewed   No history of anesthetic complications     Airway   Mallampati: II  Neck ROM: full   Pulmonary - normal exam   (+) a smoker (8.5 pack years (current))                         Cardiovascular - normal exam  Exercise tolerance: > or = 4 METS  ECG reviewed        Neuro/Psych    (+) depression, anxiety/panic attacks,     Endo/Other    (+) obesity,      GI/Hepatic/Renal    (+)   chronic renal disease (kidney stones),      Other findings: Morbid obesity, personality disorder      Dental - normal exam                        Anesthesia Plan  Planned anesthetic: general endotracheal  - scop patch  ASA 2     Anesthetic plan and risks discussed with: patient  Anesthesia plan special considerations: video-assisted, rapid sequence induction, antiemetics,   Post-op plan: routine recovery

## 2021-06-12 NOTE — ANESTHESIA CARE TRANSFER NOTE
Last vitals:   Vitals:    08/28/17 0859   BP: 101/57   Pulse: 85   Resp: 20   Temp: 36.9  C (98.5  F)   SpO2: 96%     Patient's level of consciousness is drowsy  Spontaneous respirations: yes  Maintains airway independently: yes  Dentition unchanged: yes  Oropharynx: oropharynx clear of all foreign objects    QCDR Measures:  ASA# 20 - Surgical Safety Checklist: WHO surgical safety checklist completed prior to induction  PQRS# 430 - Adult PONV Prevention: 4558F - Pt received => 2 anti-emetic agents (different classes) preop & intraop  ASA# 8 - Peds PONV Prevention: NA - Not pediatric patient, not GA or 2 or more risk factors NOT present  PQRS# 424 - Kaylan-op Temp Management: 4559F - At least one body temp DOCUMENTED => 35.5C or 95.9F within required timeframe  PQRS# 426 - PACU Transfer Protocol: - Transfer of care checklist used  ASA# 14 - Acute Post-op Pain: ASA14B - Patient did NOT experience pain >= 7 out of 10

## 2021-06-13 NOTE — PROGRESS NOTES
Mental Health and Addiction Medicine  Outpatient Subsequent Visit  By Radha Rosa M.D.    10/19/2017    Natalie J Fritsch, 1980.    This note was created by undersigned using a dictation system. All typing errors or contextual distortion is unintentional and software inherent.    Chief Complaint:  Patient was seen today in follow-up for  Chief Complaint   Patient presents with     Follow-up     Medication Management     As well as recent increase in some depression and irritability, new job, unacceptable housing issue with a second move upcoming.    Impression/Medical Decision-Making:  Diagnoses/Plan:    History of posttraumatic stress disorder (PTSD)-feels like she has worked really hard and made a lot of changes related to this.  One area is an awareness that she often seems maybe even felt angry and somewhat distant in her relationships with others.  She has done a lot of work on this and it has not been a problem in recent years.  Related personality disorder issues sometimes labeled borderline including cutting have resolved and she does not feel that diagnosis fits anymore and I would agree    Vitamin D deficiency-get a level at her upcoming visit with her primary care provider, currently use 4000 international units daily.  History of a level of 9 sometime ago.    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  Recently met with her supervisor about the fact that she is leaving and told her  some of these things and the supervisor was quite surprised and offered to support her but she is ready for her new job.  Given Jose Mcclellan's book  daily mindfulness readings.  Encouraged to read only the day not had or behind and if one misses it that is okay will get it next year.    Increased BMI-has lost a little bit of weight important for her not to focus on all the negative energy comes toward her because of her size.  She is very active and healthy recently had her gallbladder removed and blood sugars were normal in her metabolic panels.  She does need lipids checked because of her use of Abilify.  Empathized with her about the ways that larger size people are oppressed in this world.  This includes chairs that do not fit and assumptions that anyone of a larger size is obviously overeating which is not accurate.    Chronic bilateral thoracic back pain-encouraged to have this evaluated further    Follow-up in 3 months but please notify me if her job change does not resolve some of her emotional stressors at this time.  We did discuss the possibility of low-dose lithium being added if depression remained a problem.  I did clean up her problem list    Significant Interval History:   Natalie J Fritsch presents for scheduled follow-up.  Cathi states that she is become aware that she has been crabby lately also irritable and somewhat depressed.  Asked her if she thought there was something situational related to this and she said absolutely.  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  Feels like the staff is really clicky and they are not supportive of her and in fact say things right things or write her up about issues that she does not think are accurate.  She gets along extremely well with the kids and is using a variety of modalities that seem quite helpful and their evaluations are really positive.  Cathi had her gallbladder removed at the end of August.  She  finally took action and found a different job through [employer] at their [facility].   this is a dual diagnosis program in the [facility].  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  There is also the opportunity to move into different positions if this does not seem to fit quite right.  She is excited about the move.    She has been on the same medication for some time and feels like it has been very successful.  In the past she used Wellbutrin which led to daily headaches, she tried Cymbalta but felt like it made her so relaxed she just hardly could get off the couch.  Lamictal led to serious depression she i took Prozac and Paxil when she was quite young and does not have good memories of them.  She is particularly wary of the time when one is trying a new medication and something can go drastically wrong.  We did discuss the possibility if needed to to add rather than cross-taper with low-dose lithium and if that were to be useful one might consider reducing her tapering venlafaxine.  This is a decision we did make if there are problems after she is in her new job for a bit. Discussed mindfulness. something she is quite aware of and even teaches and realizes that she does not treat herself as well as she treats her clients.    She was pleased to see that she has lost a few pounds.  This is something that she has learned not to focus on.  Feels like she is able to do anything that anyone else can and is not inhibited by her size except by the things that society imposes on larger individuals for example chairs that do not fit, assumptions that larger people eat more, lectures by medical persons about weight which she had the experienced with one primary care provider of having her test  her very frequently for elevated blood sugars which she did not have but was told that she was on the border of borderline diabetes.  What is that anyway?  She does want to get more exercise she sleeps too much sometimes, new hours at her job that ended 930 rather than 1130 will help that and her mother has an elliptical machine does not fit in her home and is going to donated to Cathi.  She thinks it might be helpful if it is in her house.    Finally she made a move to an apartment that she cannot stay in there are cockroaches the ceiling is leaking she is made many complaints to the [facility] and documented everything has been told that she cannot get out of her lease, she was given a $10 rental credit for having to wash all her clothes and manage other things related to the cockroaches which have not yet been eliminated.  She actually has the opportunity to live with a friend from [place] [id number] of the [facility] who has purchased a house and would really like to have her come and live there with her and help with the mortgage payment and have a decent place and she is going to do it.  Cathi is overwhelmed by her good fortune of new job at new housing that is more affordable.  [facility]our[facility] just to protect herself legally if anything else needs to be done besides what she has already documented.    Does have some low back pain with radiation into her leg when she sits a long time actually has trouble lifting her foot at first when she stands again.    Current Stressors: economic problems, housing problems, occupational problems, problems related to social environment and problems with primary support group    Addiction History: Denies    Active Problem List:  Patient Active Problem List   Diagnosis     Vitamin D Deficiency     Acute reaction to stress     Anxiety     Headache     Increased BMI     Contraception - pills     Thoracic back pain     History of  "posttraumatic stress disorder (PTSD)     S/P cholecystectomy       Allergies as of 10/19/2017 - Jose as Reviewed 10/19/2017   Allergen Reaction Noted     Cat hair std allergenic ext  05/01/2009     Penicillins Hives 10/02/2008       Current Outpatient Prescriptions   Medication Sig Dispense Refill     ARIPiprazole (ABILIFY) 10 MG tablet Take 10 mg by mouth daily.       venlafaxine (EFFEXOR-XR) 150 MG 24 hr capsule Take 150 mg by mouth daily.       levonorgestrel-ethinyl estradiol (AVIANE,ALESSE,LESSINA) 0.1-20 mg-mcg per tablet Take 1 tablet by mouth daily.       No current facility-administered medications for this visit.        Medication Side Effects:  None    Clinincal Outcome Measures:  PHQ-9   6  BRIGID-7   6    [place] Prescription Monitoring Program:  No worrisome pharmacy activity.  Recorded in the chart    Objective:     Vitals:    10/19/17 1313   BP: 118/56   Pulse: 94   Weight: (!) 337 lb (152.9 kg)   Height: 5' 4\" (1.626 m)   PainSc: 0-No pain     Body mass index is 57.85 kg/(m^2).    General appearance: normal   Level of consciousness: alert   Gait/Station: Normal   Muscle Strength/Tone: No gross abnormalities   Postural tremor in the upper extremities: None   Cogwheel rigidity at the elbow or wrists:absent   Point-to-point maneuvers: normal   Abnormal motor movements:None noted     Psychiatric Mental Status Examination   Appearance/Grooming, dress and hygiene:Normal, elevated BMI   Consciousness/Orientation: Normal, A+O to name, date, place and situation   Behavior/Attitude:Cooperative   Mood: Euthymic:  Friendly and Pleasant, Dysphoric:  Remorseful, Sad and At times and Apprehensive:   Anxious, Nervous and Tense  Affect:   Range -> Full   Intensity -> normal   Eye contact: within normal limits   Speech: Normal   Thought processes: Normal  Thought content:Normal, Preoccupations and Ruminations, denies suicidal homicidal ideation, denies auditory and visual hallucinations  Perceptions:Normal  Insight " and judgement: Recognition of illness, Taking steps to manage illness and Readiness to change is good and the recent changes have supported and is now supportive of Cathi for continued growth  Memory-Recent: Normal   Memory-Remote: Normal   Attention/Concentration: Normal   Language: Normal   Fund of Knowledge: above average for current situation    Summary of Diagnostic Studies:    Review indicates:  Urine toxicology is not indicated for this patient.    Discharge Planning:    Reviewed risks/benefits of medication and Patient able to verbalize understanding of side effects   Congratulations about your new job    Ask for fasting lipid profile , vitamin d level    Do mindful breathing a couple times a day    Watch Whale Louie- relates to Lion breathing    Let me know if things stay difficult     Look into interventions for you back issues!!!!    Do nice things for yourself    Jose Mcclellan's book with daily mindfulness readings      Total Time:    40  minutes                 Coordination of Care:      40  minutes spent on this visit with more than 50% time spent face to face in education about diagnosis and treatment options, counseling and support as well as coordination of care with staff.  Provided a referral for support for daily practice of mindfulness, testing by her primary care physician of her vitamin D level and a fasting lipid profile glucose has been normal in several labs done before and after her cholecystectomy. Time also spent on entering orders and preparing documentation for the visit.       Radha Rosa M.D.  Certified in Addiction and Family Medicine  [facility]

## 2021-06-13 NOTE — PROGRESS NOTES
Correct pharmacy verified with patient and confirmed in snapshot? [x] yes []no    Charge captured ? [x] yes  [] no    Medications Phoned  to Pharmacy [] yes [x]no  Name of Pharmacist:  List Medications, including dose, quantity and instructions      Medication Prescriptions given to patient   [] yes  [x] no   List the name of the drug the prescription was written for.       Medications ordered this visit were e-scribed.  Verified by order class [] yes  [x] no    Medication changes or discontinuations were communicated to patient's pharmacy: [] yes  [x] no    UA collected [] yes  [x] no    [place] Prescription Monitoring Program Reviewed? [x] yes  [] no    Referrals were made to:  None    Future appointment was made: [x] yes  [] no    Dictation completed at time of chart check: [x] yes  [] no    I have checked the documentation for today s encounters and the above information has been reviewed and completed.

## 2021-06-13 NOTE — PROGRESS NOTES
Pt here for follow up and medication management    Depression PHQ-9 Score 6 somewhat difficult   Anxiety BRIGID-7 Score 6 somewhat diffiuclt  Sleep  Too many 10-11 hours   Appetite some decrease from normal     SI/HI thoughts: Denies at this time    [place]  below:  [employer] [place] Date: 10/18/17  Query Report Page#: 1  Patient Rx History Report  FRITSCH NATALIE  Search Criteria: Last Name 'fritsch' and First Name 'toya' and  =  and Request Period =   to '10/18/17' - 5 out of 5 Recipients Selected.  Fill Date Product, Str, Form Qty Days Pt ID Prescriber Written RX# N/R* Pharm **MED+  ---------- -------------------------------- ------ ---- --------- ---------- ---------- ------------ ----- --------- ------  2017 HYDROCODON-ACETAMINOPHEN 5-325 15.00 2 [id number] 2017 [id number] N [id number] 37.5  2017 HYDROCODON-ACETAMINOPHEN 5-325 10.00 3 [id number] 2017 [id number] N [id number] 16.67  06/15/2017 OXYCODONE HCL 5 MG TABLET 10.00 3 [id number] 06/15/2017 [id number] N [id number] 25.0  *N/R N=New R=Refill  +MED Daily  Prescribers for prescriptions listed  ----------------------------------------------------------------------------------------------------------------------------------  [id number] [facility]; DE[facility] PHARM[facility]ICAL [facility], [address] [facility], [address] , [place]  [id number] SITA REID; [facility], [address] [place] [address],, [place] [address]  [id number] ODILON BARRIOS; [address], [place] [address]  Pharmacies that dispensed prescriptions listed  ----------------------------------------------------------------------------------------------------------------------------------  [id number] [facility]; [address] [place] [address],  [id number] [facility]; : [id number], [address],, [facility] [address],  Patients that match search  criteria  ----------------------------------------------------------------------------------------------------------------------------------  [id number] FRITSCH NATALIE,  82; [address] [place] [address]  [id number] FRITSCH NATALIE, [facility] 82; [address], [place] [address]  [id number] FRITSCH NATALIE, [facility] 82; [address], [place] [address]  [id number] FRITSCH NATALIE, [facility] 82; [facility] [place] [address]  [id number] FRITSCH NATALIE, [facility] 82; [address] [place], [place] [address] [place], [place] [address]  MED Summary  This section displays cumulative MED values by unique recipient. The MED Max value is the maximum occurrence of cumulative MED  sustained for any 3 consecutive days. This value is calculated based on prescriptions dispensed during the date range requested.  -----------------------------------------------------------------------------------------------------------------------------------  25 FRITSCH, NATALIE; 1980; [address] [place] [address]  16.67 FRITSCH, NATALIE; 1980; [address] [place], [place] [address] [place], [place] [address]  **Per CDC guidance, the conversion factors and associated daily morphine milligram equivalents for drugs prescribed as part of  medication-assisted treatment for opioid use disorder should not be used to benchmark against dosage thresholds meant for opioids  prescribed for pain.  Report Disclaimers:  The report provided above is based upon the search criteria and the data provided by the dispensing entities. For more information  about any prescription, please contact the dispenser or the prescriber.  This report contains confidential information, including patient identifiers, and is not a public record. The information on this  report must be treated as protected health information and is to be disclosed to others only as authorized by applicable state  and Federal regulations.
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
## 2021-06-15 NOTE — PROGRESS NOTES
Correct pharmacy verified with patient and confirmed in snapshot? [x] yes []no    Charge captured ? [x] yes  [] no    Medications Phoned  to Pharmacy [] yes [x]no  Name of Pharmacist:  List Medications, including dose, quantity and instructions      Medication Prescriptions given to patient   [] yes  [x] no   List the name of the drug the prescription was written for.       Medications ordered this visit were e-scribed.  Verified by order class [] yes  [x] no    Medication changes or discontinuations were communicated to patient's pharmacy: [] yes  [x] no    UA collected [] yes  [x] no    [place] Prescription Monitoring Program Reviewed? [x] yes  [] no    Referrals were made to: None     Future appointment was made: [x] yes  [] no   04/16/18  Dictation completed at time of chart check: [x] yes  [] no    I have checked the documentation for today s encounters and the above information has been reviewed and completed.

## 2021-06-15 NOTE — PROGRESS NOTES
Pt here for follow up and medication management    Depression PHQ-9 Score  3 somewhat difficult; new job and very happy   Anxiety BRIGID-7 Score 2  Not difficult at all   Sleep good better routine   Appetite normal     SI/HI thoughts: Denies at this time     [place]  below:  [employer] [place] Date: 18  Query Report Page#: 1  Patient Rx History Report  FRITSCH NATALIE  Search Criteria: Last Name 'fritsch' and First Name 'toya' and  =  and Request Period =   to ' - 5 out of 5 Recipients Selected.  Fill Date Product, Str, Form Qty Days Pt ID Prescriber Written RX# N/R* Pharm **MED+  ---------- -------------------------------- ------ ---- --------- ---------- ---------- ------------ ----- --------- ------  2017 HYDROCODONE-ACETAMIN 5-325 MG 15.00 2 [id number] 2017 [id number] N [id number] 37.5  *N/R N=New R=Refill  +MED Daily  Prescribers for prescriptions listed  ----------------------------------------------------------------------------------------------------------------------------------  [id number] ODILON BARRIOS; [facility], [place] [address]  Pharmacies that dispensed prescriptions listed  ----------------------------------------------------------------------------------------------------------------------------------  [id number] [facility]; : [id number], [address], [facility] [address],  Patients that match search criteria  ----------------------------------------------------------------------------------------------------------------------------------  [id number] FRITSCH NATALIE, [facility]; [address] [place] [address]  [id number] FRITSCH NATALIE,  82; [address], [place] [address]  [id number] FRITSCH NATALIE,  82; [address], [place] [address]  [id number] FRITSCH NATALIE,  82; [address] [address]  [id number] FRITSCH NATALIE,  82; [address] [facility] [address] [place] [address]  MED Summary  This section displays cumulative MED values by unique recipient. The MED Max value is the maximum occurrence of cumulative MED  sustained for any 3 consecutive days. This value is calculated based on prescriptions dispensed during the date range requested.  -----------------------------------------------------------------------------------------------------------------------------------  0 FRITSCH, NATALIE; 1980; [address] [place] [address] [address] [place] [address]  **Per CDC guidance, the conversion factors and associated daily morphine milligram equivalents for drugs prescribed as part of  medication-assisted treatment for opioid use disorder should not be used to benchmark against dosage thresholds meant for opioids  prescribed for pain.  Report Disclaimers:  The report provided above is based upon the search criteria and the data provided by the dispensing entities. For more information  about any prescription, please contact the dispenser or the prescriber.  This report contains confidential information, including patient identifiers, and is not a public record. The information on this  report must be treated as protected health information and is to be disclosed to others only as authorized by applicable state  and Federal regulations.

## 2021-06-16 PROBLEM — F33.41 RECURRENT MAJOR DEPRESSIVE DISORDER, IN PARTIAL REMISSION (H): Status: ACTIVE | Noted: 2018-01-19

## 2021-06-17 NOTE — PROGRESS NOTES
Patient is here for follow up and medication management.      Depression PHQ-9: Score 4 not difficult at al   Anxiety BRIGID-7: Score 3 not difficult at all   Mood-Current: Score  4 somewhat difficult   Sleep: good   Appetite: normal   Smokin /packs    Thinking about quitting: not ready     SI/HI thoughts: Denies at this time   Concerns today:no new concerns     [place]  below:  [place] [place] Date: 18  Query Report Page#: 1  Patient Rx History Report  FRITSCH NATALIE  Search Criteria: Last Name 'fritsch' and First Name 'toya' and  =  and Request Period =   to ' - 6 out of 6 Recipients Selected.  Fill Date Product, Str, Form Qty Days Pt ID Prescriber Written RX# N/R* Pharm **MED+  ---------- -------------------------------- ------ ---- --------- ---------- ---------- ------------ ----- --------- ------  2018 OXYCODONE HCL 5 MG TABLET 10.00 1 [id number] 2018 [id number] N [id number] 75.0  *N/R N=New R=Refill  +MED Daily  Prescribers for prescriptions listed  ----------------------------------------------------------------------------------------------------------------------------------  [id number] DONNIE BAUGH MD; [facility], DEPT OF EMERGENCY [id number] [place] [address]  Pharmacies that dispensed prescriptions listed  ----------------------------------------------------------------------------------------------------------------------------------  [id number] [facility]; : DEMLA # [id number], [address] [facility] [address],  Patients that match search criteria  ----------------------------------------------------------------------------------------------------------------------------------  [id number] FRITSCH NATALIE,  84; [address] [facility] [address]  [id number] FRITSCH NATALIE,  [birth year]; [address], [place] [address]  [id number] FRITSCH NATALIE,  82; [address],  [place] [address]  [id number] FRITSCH NATALIE,  84; [address] [place] [address]  [id number] FRITSCH NATALIE,  84; [address] [facility] [address] [place], [place] [address]  [id number] FRITSCH NATALIE,  84; [address] [facility] [address], [place] [address]  MED Summary  This section displays cumulative MED values by unique recipient. The MED Max value is the maximum occurrence of cumulative MED  sustained for any 3 consecutive days. This value is calculated based on prescriptions dispensed during the date range requested.  -----------------------------------------------------------------------------------------------------------------------------------  0 FRITSCH, NATALIE; 1980; [address] [facility] [address], [place] [address]  **Per CDC guidance, the conversion factors and associated daily morphine milligram equivalents for drugs prescribed as part of  medication-assisted treatment for opioid use disorder should not be used to benchmark against dosage thresholds meant for opioids  prescribed for pain.  Report Disclaimers:  The report provided above is based upon the search criteria and the data provided by the dispensing entities. For more information  about any prescription, please contact the dispenser or the prescriber.  This report contains confidential information, including patient identifiers, and is not a public record. The information on this  report must be treated as protected health information and is to be disclosed to others only as authorized by applicable state  and Federal regulations.

## 2021-06-17 NOTE — PROGRESS NOTES
Mental Health and Addiction Medicine  Outpatient Subsequent Visit  By Radha Rosa M.D.    5/4/2018    Cathi VANN Fritsch, 1980.      Chief Complaint:  Patient was seen today in follow-up for  Chief Complaint   Patient presents with     Follow-up     Medication Management     As well as recurrent major depression in full remission, anxiety, PTSD with some unresolved issues around intimacy, elevated BMI    Impression/Medical Decision-Making:  Diagnoses/Plan:    Anxiety-marked very low score on her self-report as it relates to anxiety but speaks to me clinically about a number of issues that make her anxious for example saying to [employer] that she has decided to take a different job and feeling guilty about it even though she has received a actual offer.  Fearful about exploring her issue and staying in really unhealthy relationships.  Not excited about finding a personal primary care provider    History of posttraumatic stress disorder (PTSD)-Cathi has apparently done a lot of work in relationship to her PTSD formally had a diagnosis of bipolar disorder that was translated and borderline personality disorder and she is done DBT in [facility].  Performing well in her work and has a lot to offer people in early recovery but she shares with me she says in a way that she has not really told anybody else that she tends to have intermittent but long-term relationships with extremely unhealthy men.    Shared with her about th adaptive interrelational model of treating chronic trauma with dissociative states.  Shared the website with her that talks about the mom will provide some brain chemistry information and provides articles about how childhood trauma affects brains and potentially genes and creates patterns later in life that are difficult change without sustained practicing new ways.  I did reassure her that establishing safety pacing the process and not specifically needing people to go into her traumatic  memories and thus both the therapist and the person decided that the situation is safe and its appropriate are important to do so.  Empathized with her worried that she opens this can of worms things could fall apart but I really trust the people who runs extensive training and provide consultation therapists after they have completed it.  She states she is going to explore the trained providers and see if there is someone who might fit.    Increased BMI-has always stated that she feels comfortable with her size and that it does not really get in her way though given her societies prejudiced about size there to be some things that are challenging.  Also may serve as a interface between her and the world in actually somewhat diminished her chances for exploring intimacy with men who are more healthy.    Recurrent major depressive disorder, in full remission    Follow-up in 3 months, Completely reasonable to come sooner to my chart or call if she needs support or help with making decisions about whether or not to proceed with a particular therapist    Significant Interval History:   Natalie J Fritsch presents for follow-up.  Cathi miranda did go to family treat as intended and received the implantable progesterone contraceptive.  When I saw her last she was liking her job but it was relatively new and she does not like it now.  She is working at [employer] facility that emphasizes health realization over 12 step recovery.  The problem is really that her coworkers gossip a great deal and she finds that unappealing and difficult.  She is continued to work [employer] part-time and they recently offered her a full-time position though she has not yet received an offer letter.  Verbally she said she would like the job but she has since interviewed at [facility] and been called back for a second interview and that is very appealing to her.  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  Feeling guilty about [employer].    I asked Cathi about basically unhealthy relationship that she has been involved in she said that she was not answering calls really thought she was done but the person showed up at work in the [employer] and that helped her and pretty soon he was staying with her.  At one point she looked at his phone and he was saying the same things with some other person and she actually did ask him to leave.  She is somewhat open to starting to do some work on this aspect of her trauma that she really has not resolved but she is also scared that she will get derailed and interfere with her work.  Continues to prefer to stay on her current dose of Abilify.    Current Stressors: economic problems, occupational problems, other psychosocial or environmental problems and problems with primary support group    Addiction History:denies    Active Problem List:  Patient Active Problem List   Diagnosis     Vitamin D Deficiency     Acute reaction to stress     Anxiety     Headache     Increased BMI     Contraception - pills     Thoracic back pain     History of posttraumatic stress disorder (PTSD)     S/P cholecystectomy     Recurrent major depressive disorder, in partial remission       Allergies as of 05/04/2018 - Jose as Reviewed 05/04/2018   Allergen Reaction Noted     Cat hair std allergenic ext  05/01/2009     Penicillins Hives 10/02/2008       Current Outpatient Prescriptions   Medication Sig Dispense Refill     ARIPiprazole (ABILIFY) 10 MG tablet Take 10 mg by mouth daily.       venlafaxine (EFFEXOR-XR) 150 MG 24 hr capsule TAKE 1 CAPSULE BY MOUTH ONCE DAILY. 90 capsule 3     cholecalciferol, vitamin D3, 50,000 unit capsule One weekly for 6 months 13 capsule 1     No current facility-administered medications for this visit.        Medication Side Effects:  None    Clinincal  "Outcome Measures:  PHQ-9   4  BRIGID-7   3  MDQCurrent:  4      [place] Prescription Monitoring Program:  No worrisome pharmacy acivity.  Recorded in the chart    Objective:     Vitals:    18 1114   BP: (!) 129/92   Pulse: 87   Weight: (!) 353 lb (160.1 kg)   Height: 5' 4.76\" (1.645 m)     Body mass index is 59.18 kg/(m^2).    General appearance: normal   Level of consciousness: alert   Gait/Station: Normal   Muscle Strength/Tone: No gross abnormalities   Postural tremor in the upper extremities: None   Cogwheel rigidity at the elbow or wrists:absent   Point-to-point maneuvers: normal   Abnormal motor movements:None noted     Psychiatric Mental Status Examination   Appearance/Grooming, dress and hygiene:Normal, increased BMI   Consciousness/Orientation: Normal, A+O to name, date, place and situation   Behavior/Attitude:Cooperative   Mood: Euthymic:  Friendly and Pleasant and Apprehensive:   Anxious and Fearful  Affect:   Range -> Full   Intensity -> normal   Eye contact: within normal limits   Speech: Normal   Thought processes: Normal  Thought content:Normal, Preoccupations and Ruminations, denies suicidal homicidal ideation, denies auditory and visual hallucinations  Perceptions:Normal  Insight and judgement: Recognition of illness, Taking steps to manage illness and Readiness to change ,somewhat fearful about the consequences of re-engaging with her PTSD  Memory-Recent: Normal   Memory-Remote: Normal   Attention/Concentration: Normal   Language: Normal   Fund of Knowledge: average for current situation    Summary of Diagnostic Studies:    Review indicates:  Urine toxicology is not indicated for this patient.    Discharge Plannin    Reviewed risks/benefits of medication and Patient able to verbalize understanding of side effects [facility] runs  Trauma training's.  Their model is great and there is a description of the training and a list of people who have been " "trained.    Safety and working at a pace where you do not feel or get \"out of control \" is very important      Total Time:    35 minutes                 Coordination of Care:      35  minutes spent on this visit with more than 50% time spent face to face in education about diagnosis and treatment options, counseling and support as well as coordination of care with staff.  Provided a referral for trauma informed therapy. Time also spent on entering orders and preparing documentation for the visit.       Radha Rosa M.D.  Certified in Addiction and Family Medicine  [facility]        "

## 2021-06-17 NOTE — PATIENT INSTRUCTIONS - HE
"Patient Instructions by Gilbert Noriega CNP at 12/27/2019  7:30 AM     Author: Gilbert Noriega CNP Service: -- Author Type: Nurse Practitioner    Filed: 12/27/2019  8:59 AM Encounter Date: 12/27/2019 Status: Signed    : Gilbert Noriega CNP (Nurse Practitioner)         Patient Education     Viral Syndrome (Adult)  A viral illness may cause a number of symptoms. The symptoms depend on the part of the body that the virus affects. If it settles in your nose, throat, and lungs, it may cause cough, sore throat, congestion, and sometimes headache. If it settles in your stomach and intestinal tract, it may cause vomiting and diarrhea. Sometimes it causes vague symptoms like \"aching all over,\" feeling tired, loss of appetite, or fever.  A viral illness usually lasts 1 to 2 weeks, but sometimes it lasts longer. In some cases, a more serious infection can look like a viral syndrome in the first few days of the illness. You may need another exam and additional tests to know the difference. Watch for the warning signs listed below.  Home care  Follow these guidelines for taking care of yourself at home:    If symptoms are severe, rest at home for the first 2 to 3 days.    Stay away from cigarette smoke - both your smoke and the smoke from others.    You may use over-the-counter acetaminophen or ibuprofen for fever, muscle aching, and headache, unless another medicine was prescribed for this. If you have chronic liver or kidney disease or ever had a stomach ulcer or GI bleeding, talk with your doctor before using these medicines. No one who is younger than 18 and ill with a fever should take aspirin. It may cause severe disease or death.    Your appetite may be poor, so a light diet is fine. Avoid dehydration by drinking 8 to 12 8-ounce glasses of fluids each day. This may include water; orange juice; lemonade; apple, grape, and cranberry juice; clear fruit drinks; electrolyte replacement and sports drinks; and " decaffeinated teas and coffee. If you have been diagnosed with a kidney disease, ask your doctor how much and what types of fluids you should drink to prevent dehydration. If you have kidney disease, drinking too much fluid can cause it build up in the your body and be dangerous to your health.    Over-the-counter remedies won't shorten the length of the illness but may be helpful for cough, sore throat; and nasal and sinus congestion. Don't use decongestants if you have high blood pressure.  Follow-up care  Follow up with your healthcare provider if you do not improve over the next week.  Call 911  Call 911 if any of the following occur:    Convulsion    Feeling weak, dizzy, or like you are going to faint    Chest pain, shortness of breath, wheezing, or difficulty breathing  When to seek medical advice  Call your healthcare provider right away if any of these occur:    Cough with lots of colored sputum (mucus) or blood in your sputum    Chest pain, shortness of breath, wheezing, or difficulty breathing    Severe headache; face, neck, or ear pain    Severe, constant pain in the lower right side of your belly (abdominal)    Continued vomiting (cant keep liquids down)    Frequent diarrhea (more than 5 times a day); blood (red or black color) or mucus in diarrhea    Feeling weak, dizzy, or like you are going to faint    Extreme thirst    Fever of 100.4 F (38 C) or higher, or as directed by your healthcare provider  Date Last Reviewed: 9/25/2015 2000-2017 [employer] [address] [place] [address], [place] [address]. All rights reserved. This information is not intended as a substitute for professional medical care. Always follow your healthcare professional's instructions.

## 2021-06-17 NOTE — PROGRESS NOTES
Correct pharmacy verified with patient and confirmed in snapshot? [x] yes []no    Charge captured ? [x] yes  [] no    Medications Phoned  to Pharmacy [] yes [x]no  Name of Pharmacist:  List Medications, including dose, quantity and instructions      Medication Prescriptions given to patient   [] yes  [x] no   List the name of the drug the prescription was written for.       Medications ordered this visit were e-scribed.  Verified by order class [] yes  [x] no    Medication changes or discontinuations were communicated to patient's pharmacy: [] yes  [x] no    UA collected [] yes  [x] no    [place] Prescription Monitoring [place] Reviewed? [x] yes  [] no    Referrals were made to:   None    Future appointment was made: [] yes  [x] no    Dictation completed at time of chart check: [x] yes  [] no    I have checked the documentation for today s encounters and the above information has been reviewed and completed.

## 2021-06-18 NOTE — PROGRESS NOTES
Assessment/Plan:   Sore throat  Tonsillitis  Lymphadenitis, acute right anterior cervical  Has had throat pain and swelling for 5 days associated with fatigue.  RST negative. Tonsillitis and adenitis on exam. Consider mono if pain and swelling persist more than 7 days from onset - return at that time for testing   - Rapid Strep A Screen-Throat  - Group A Strep, RNA Direct Detection, Throat  - azithromycin (ZITHROMAX) 500 MG tablet; Take 1 tablet (500 mg total) by mouth daily for 5 days.  Dispense: 5 tablet; Refill: 0  - predniSONE (DELTASONE) 20 MG tablet; 40mg daily for 3 days  Dispense: 6 tablet; Refill: 0    Strep confirmation test is pending. We will call tomorrow only if positive.   Azithromycin once daily for 5 days - take with food  Eat yogurt or take probiotics while on antibiotic  Prednisone 40mg daily for 3 days for swelling in throat  Tylenol or ibuprofen  Follow up if worse or no better for further evaluation of possible mono    Subjective:      Natalie J Fritsch is a 37 y.o. female who presents with ST.  She developed painful throat Monday or Tuesday - 5 days ago.  She has continued to have pain with swallow.  Her anterior cervical lymph nodes have become bigger and more tender as the week has progressed R>L.  No fever or chills, no URI, no cough except usual related to smoking, no history of seasonal allergies. No HA or joint pains.  No abdominal pain, N/V/D.  No rash.  No urinary sxs or blood in urine. No shortness of breath. She was exposed to strep several days before the pain started.  She feels very fatigued. She hs not been eating well to the swelling in throat and pain with swallow.  No wheeze, tongue or lips swelling or itching in throat. No new medications or allergy triggers.     Allergies   Allergen Reactions     Cat Hair Std Allergenic Ext      Penicillins Hives     Current Outpatient Prescriptions on File Prior to Visit   Medication Sig Dispense Refill     ARIPiprazole (ABILIFY) 10 MG  tablet Take 10 mg by mouth daily.       cholecalciferol, vitamin D3, 50,000 unit capsule One weekly for 6 months 13 capsule 1     venlafaxine (EFFEXOR-XR) 150 MG 24 hr capsule TAKE 1 CAPSULE BY MOUTH ONCE DAILY. 90 capsule 3     No current facility-administered medications on file prior to visit.      Patient Active Problem List   Diagnosis     Vitamin D Deficiency     Acute reaction to stress     Anxiety     Headache     Increased BMI     Contraception - pills     Thoracic back pain     History of posttraumatic stress disorder (PTSD)     S/P cholecystectomy     Recurrent major depressive disorder, in partial remission       Objective:     /65  Pulse 95  Temp 98.6  F (37  C) (Oral)   Wt (!) 351 lb (159.2 kg)  SpO2 97%  BMI 58.84 kg/m2    Physical  General Appearance: Alert, cooperative, no distress, low energy  Head: Normocephalic, without obvious abnormality, atraumatic  Eyes: Conjunctivae are normal.   Ears: Normal TMs and external ear canals, both ears  Nose: No significant congestion, no sinus pain with percussion  Throat: Throat is red with large tonsils.  No exudate.  Uvula midline. No vesicular lesions  Neck: tender enlarged adenopathy R>L, no posterior nodes, neck is supple  Lungs: Clear to auscultation bilaterally, respirations unlabored  Heart: Regular rate and rhythm  Abdomen: Soft, non-tender, no organomegaly  Skin: no rashes or lesions  Psychiatric: Patient has a normal mood and affect.        Recent Results (from the past 24 hour(s))   Rapid Strep A Screen-Throat   Result Value Ref Range    Rapid Strep A Antigen No Group A Strep detected, presumptive negative No Group A Strep detected, presumptive negative

## 2021-06-18 NOTE — PROGRESS NOTES
Correct pharmacy verified with patient and confirmed in snapshot? [x] yes []no    Charge captured ? [] yes  [x] no    Medications Phoned  to Pharmacy [] yes [x]no  Name of Pharmacist:  List Medications, including dose, quantity and instructions      Medication Prescriptions given to patient   [] yes  [x] no   List the name of the drug the prescription was written for.       Medications ordered this visit were e-scribed.  Verified by order class [] yes  [x] no    Medication changes or discontinuations were communicated to patient's pharmacy: [] yes  [x] no    UA collected [] yes  [x] no    [place] Prescription Monitoring [place] Reviewed? [x] yes  [] no    Referrals were made to:  none    Future appointment was made: [x] yes  [] no   08/13/18  Dictation completed at time of chart check: [] yes  [x] no    I have checked the documentation for today s encounters and the above information has been reviewed and completed.

## 2021-06-18 NOTE — PROGRESS NOTES
Mental Health and Addiction Medicine  Outpatient Subsequent Visit  By Radha Rosa M.D.    6/14/2018    Natalie J Fritsch, 1980.      Chief Complaint:  Patient was seen today in follow-up for  Chief Complaint   Patient presents with     Follow-up     As well as PTSD manifesting in relationship issues, elevated BMI, will be losing insurance for 1 month, anxiety, discuss medication taper    Impression/Medical Decision-Making:  Diagnoses/Plan:    History of posttraumatic stress disorder (PTSD)-significant issues with unstable sense of self low self-worth self harming activities in the past that she is worked on successfully in therapy resolution of a borderline personality diagnosis admits to residual issues around intimacy and choosing inappropriate and sometimes detrimental partners.  Wants to work on this but is scared to.  Again gave her the name of the [facility] to look through the list of individuals who have completed their additional training.  She commits to making meals calls to therapists in her area and we went over some of them specifically and what their skills are in addition to added working trauma.  Most are certified E MDR and a couple had done formal training in somatic experiencing.    Recurrent major depressive disorder, in partial remission (H)-it was a time when her diagnosis was episodic mood disorder but she thinks that most of this could be attributed to the borderline issues related to PTSD though her description of going off the Abilify is a little bit suspect.  Made a decision that she would cut her Abilify in half and stay on that and after 3 or 4 weeks begin to taper her Effexor the instructions are in the discharge area    Increased BMI, has always complained that her size does not really interfere with her life this may not be completely true and perhaps discontinuing Abilify slowly will help her make an impact on her weight.  Generally size  "is a way to deflect healthy intimacy in the context of PTSD    Headache-none severe currently, has one today which she thinks is related to excess caffeine    Anxiety-about reentering serious therapy    Relationship problems-intermittently involved with the person who has narcissistic and sociopathic tendencies and who is not monogamous nor particularly supportive    Follow-up actually scheduled for mid July though I am realizing now she may not have insurance and we can potentially change that would at least like to have a telephone check in from her    Significant Interval History:   Natalie J Fritsch presents for follow-up.  Cathi is coming today because she would like to go off her Abilify and Effexor.  This is prompted by the fact that she has started a new job at [employer] and during the month of July she will have insurance.  Also prompted by reading more about the pros and cons of Abilify.  She likes her new job but was surprised to be given shift that started at 7 AM rather than 9.  She is not a morning person but she is working really hard at it and thinks that she will enjoy it once a habit.  At the moment her mother is giving her a call at 6 in the morning.  She also sets her alarm.  She has not been late to work.  Enjoys being able to do things with friends after work.  She is generally works the evening shift which is often left her somewhat isolated.  In the past on a couple of occasions she has discontinued her Abilify and Effexor simultaneously without any taper.  States that she feels like her mood gets labile in a very short time although others do not seem to notice any agitation.  She is restarted when she has not felt good within a week or 2.  She has never actually worked on tapering her medication.    She has been getting calls again from X boyfriend who is what she calls \"a sociopath\" but whom she has had difficulty staying away from permanently.  This area of relationship intimacy is the " final piece of her PTSD that she has not ever really worked on.  Still a lot of other therapeutic work originally carried a diagnosis of borderline personality disorder that has resolved.  She is no longer self harming in any way with the exception of perhaps the emotional toll of this relationship.  Because her schedule was in flux she used is that as an excuse not to make any calls to people on the list of therapists who have received extra training in managing trauma.  Pressure is a little bit higher today measuring 144/102 with a pulse of 88 declines to be weighed.  States that generally her blood pressure has been very good though I see some significant fluctuations on her blood pressure graph.    Current Stressors: economic problems, occupational problems, other psychosocial or environmental problems, problems related to social environment, problems with access to health care services and problems with primary support group    Addiction History: Denies    Active Problem List:  Patient Active Problem List   Diagnosis     Vitamin D Deficiency     Acute reaction to stress     Anxiety     Headache     Increased BMI     Contraception - pills     Thoracic back pain     History of posttraumatic stress disorder (PTSD)     S/P cholecystectomy     Recurrent major depressive disorder, in partial remission (H)       Allergies as of 06/14/2018 - Jose as Reviewed 05/26/2018   Allergen Reaction Noted     Cat hair std allergenic ext  05/01/2009     Penicillins Hives 10/02/2008       Current Outpatient Prescriptions   Medication Sig Dispense Refill     ARIPiprazole (ABILIFY) 10 MG tablet Take 10 mg by mouth daily.       cholecalciferol, vitamin D3, 50,000 unit capsule One weekly for 6 months 13 capsule 1     predniSONE (DELTASONE) 20 MG tablet 40mg daily for 3 days 6 tablet 0     venlafaxine (EFFEXOR-XR) 150 MG 24 hr capsule TAKE 1 CAPSULE BY MOUTH ONCE DAILY. 90 capsule 3     No current facility-administered medications for  this visit.        Medication Side Effects:  None    Clinincal Outcome Measures:  Not completed formally today but generally unchanged and relatively low    [place] Prescription Monitoring Program:  No worrisome pharmacy activity.  Recorded in the chart    Objective:   There were no vitals filed for this visit.  There is no height or weight on file to calculate BMI.    General appearance: normal   Level of consciousness: alert   Gait/Station: Normal   Muscle Strength/Tone: No gross abnormalities   Postural tremor in the upper extremities: None   Cogwheel rigidity at the elbow or wrists:absent   Point-to-point maneuvers: normal   Abnormal motor movements:None noted     Psychiatric Mental Status Examination   Appearance/Grooming, dress and hygiene:Normal, elevated BMI, multiple tattoos  Consciousness/Orientation: Normal, A+O to name, date, place and situation   Behavior/Attitude:Cooperative   Mood: Euthymic:  Friendly and Pleasant, Dysphoric:  Overwhelmed and Remorseful, Apathetic:   Hard to change some of these last self caring behaviors and Apprehensive:   Anxious, Nervous and About new job and reentering therapy  Affect:   Range -> Full   Intensity -> normal   Eye contact: within normal limits   Speech: Normal   Thought processes: Normal  Thought content:Normal, Preoccupations and Ruminations, denies suicidal homicidal ideation, denies auditory and visual hallucinations  Perceptions:Normal  Insight and judgement: Recognition of illness, Taking steps to manage illness and Readiness to change , becoming open to some additional work related to PTSD Memory-Recent: Normal   Memory-Remote: Normal   Attention/Concentration: Normal   Language: Normal   Fund of Knowledge: average for current situation    Summary of Diagnostic Studies:    Review indicates:  Urine toxicology is not indicated for this patient.    Discharge Planning:    Reviewed risks/benefits of medication and Patient able to verbalize understanding of side  effects Decrease the Abilify to 5mg, cut in half    After a month or so start tapering the effexor by 1/4 each week for 3 weeks and then that last 1/4 over a month  (remember you are still taking 5mg of Abilify  Protocol for tapering SSRI's and SNRI's. Reduction from full dose to perhaps a 1/4 dose can be accomplished in a few weeks.  The last 1/4 often more problematic. In general as you continue to decrease, use the method that if you make a reduction and you begin to have symptoms go back to where you were before and reduce by half of what you had done previously.   Discontinuation symptoms can include nausea and dizziness, flu like symptoms, brief 'brain zaps' and a non-specific feeling of 'not well'  Tablets can be split into smaller pieces.  The capsules can be opened, each little particle retains its extended release properties.  And portions can be removed in small increments    If she is able to successfully come off the Effexor will begin to taper Abilify further    Total Time:   30 minutes                  Coordination of Care:    30    minutes spent on this visit with more than 50% time spent face to face in education about diagnosis and treatment options, counseling and support as well as coordination of care with staff.  Provided a referral for trauma informed therapy. Time also spent on entering orders and preparing documentation for the visit.       Radha Rosa M.D.  Certified in Addiction and Family Medicine  [facility]

## 2021-06-28 NOTE — PROGRESS NOTES
Progress Notes by Gilbert Noriega CNP at 12/27/2019  7:30 AM     Author: Gilbert Noriega CNP Service: -- Author Type: Nurse Practitioner    Filed: 12/27/2019  9:01 AM Encounter Date: 12/27/2019 Status: Signed    : Gilbert Noriega CNP (Nurse Practitioner)       Chief Complaint   Patient presents with   ? Cough     ongoing coughing that is worsening    ? Medication Refill     would like a refill of psych meds- patient does not have a primary for these meds        HPI:  Natalie J Fritsch is a 39 y.o. female who presents today complaining of ongoing, worsening cough over the past 2 days    History obtained from the patient.    Problem List:  2018-01: Recurrent major depressive disorder, in partial remission (H)  2016-10: History of posttraumatic stress disorder (PTSD)  2016-06: Thoracic back pain  2016-06: Uses birth control  2016-01: Health care maintenance  2015-06: Thoracic back pain  Obesity  Gastroenteritis  Monoarticular Inflammation  Lower Back Pain  Diarrhea  Borderline personality disorder (H)  Cocaine abuse (H)  Vitamin D Deficiency  Acute reaction to stress  Anal fissure  Vaginal Discharge  Abdominal Pain  Anxiety  Nephrolithiasis  Headache  Increased BMI  S/P cholecystectomy      Past Medical History:   Diagnosis Date   ? Abnormal Pap smear of cervix     Created by Conversion    ? Acute cholecystitis 8/27/2017   ? Anal fissure    ? Bipolar disorder (H)    ? Cholecystitis 8/27/2017    Added automatically from request for surgery [id number]   ? H/O borderline personality disorder 10/21/2016   ? Nephrolithiasis    ? Obese        Social History     Tobacco Use   ? Smoking status: Current Every Day Smoker     Packs/day: 0.50     Years: 17.00     Pack years: 8.50     Types: Cigarettes   ? Smokeless tobacco: Never Used   Substance Use Topics   ? Alcohol use: Yes     Comment: social        Review of Systems   Constitutional: Negative for activity change, appetite change, chills, fatigue and fever.   HENT:  Positive for congestion. Negative for ear pain and sore throat.    Respiratory: Positive for cough. Negative for shortness of breath and wheezing.    Gastrointestinal: Negative for diarrhea and vomiting.   Genitourinary: Negative for dysuria.   Musculoskeletal: Positive for myalgias.   All other systems reviewed and are negative.      Vitals:    12/27/19 0807   BP: 113/81   Pulse: 95   Resp: 14   Temp: 98.4  F (36.9  C)   SpO2: 96%   Weight: (!) 384 lb 3.2 oz (174.3 kg)       Physical Exam  Constitutional:       General: She is not in acute distress.     Appearance: She is ill-appearing and diaphoretic. She is not toxic-appearing.   HENT:      Head: Normocephalic.      Right Ear: Tympanic membrane, ear canal and external ear normal. There is no impacted cerumen.      Left Ear: Tympanic membrane, ear canal and external ear normal. There is no impacted cerumen.      Nose: Rhinorrhea present. No congestion.      Mouth/Throat:      Mouth: Mucous membranes are moist.      Pharynx: No oropharyngeal exudate or posterior oropharyngeal erythema.   Neck:      Musculoskeletal: Neck supple.   Cardiovascular:      Rate and Rhythm: Normal rate and regular rhythm.      Pulses: Normal pulses.      Heart sounds: Normal heart sounds. No murmur. No friction rub. No gallop.    Pulmonary:      Effort: Pulmonary effort is normal. No respiratory distress.      Breath sounds: No stridor. Wheezing present. No rhonchi or rales.      Comments: Scattered fine wheeze throughout  Lymphadenopathy:      Cervical: No cervical adenopathy.   Skin:     General: Skin is warm.      Capillary Refill: Capillary refill takes less than 2 seconds.      Findings: No rash.   Neurological:      General: No focal deficit present.      Mental Status: She is alert and oriented to person, place, and time. Mental status is at baseline.   Psychiatric:         Mood and Affect: Mood normal.         Behavior: Behavior normal.       No notes on file    Labs:  Recent  "Results (from the past 72 hour(s))   Influenza A/B Rapid Test   Result Value Ref Range    Influenza  A, Rapid Antigen No Influenza A antigen detected No Influenza A antigen detected    Influenza B, Rapid Antigen No Influenza B antigen detected No Influenza B antigen detected       Radiology: No results found.    Clinical Decision Making: At the end of the encounter, I discussed results, diagnosis, medications. Discussed red flags for immediate return to clinic/ER, as well as indications for follow up if no improvement. Patient will reach out to previous prescribed for Effexor to obtain a 1 week course to cover her until the 1/3/2020 scheduled mental health appointment. Patient understood and agreed to plan.     GINA Adams, CNP       1. Viral syndrome  albuterol (PROAIR HFA;PROVENTIL HFA;VENTOLIN HFA) 90 mcg/actuation inhaler   2. Myalgia  Influenza A/B Rapid Test         Patient Instructions     Patient Education     Viral Syndrome (Adult)  A viral illness may cause a number of symptoms. The symptoms depend on the part of the body that the virus affects. If it settles in your nose, throat, and lungs, it may cause cough, sore throat, congestion, and sometimes headache. If it settles in your stomach and intestinal tract, it may cause vomiting and diarrhea. Sometimes it causes vague symptoms like \"aching all over,\" feeling tired, loss of appetite, or fever.  A viral illness usually lasts 1 to 2 weeks, but sometimes it lasts longer. In some cases, a more serious infection can look like a viral syndrome in the first few days of the illness. You may need another exam and additional tests to know the difference. Watch for the warning signs listed below.  Home care  Follow these guidelines for taking care of yourself at home:    If symptoms are severe, rest at home for the first 2 to 3 days.    Stay away from cigarette smoke - both your smoke and the smoke from others.    You may use over-the-counter acetaminophen " or ibuprofen for fever, muscle aching, and headache, unless another medicine was prescribed for this. If you have chronic liver or kidney disease or ever had a stomach ulcer or GI bleeding, talk with your doctor before using these medicines. No one who is younger than 18 and ill with a fever should take aspirin. It may cause severe disease or death.    Your appetite may be poor, so a light diet is fine. Avoid dehydration by drinking 8 to 12 8-ounce glasses of fluids each day. This may include water; orange juice; lemonade; apple, grape, and cranberry juice; clear fruit drinks; electrolyte replacement and sports drinks; and decaffeinated teas and coffee. If you have been diagnosed with a kidney disease, ask your doctor how much and what types of fluids you should drink to prevent dehydration. If you have kidney disease, drinking too much fluid can cause it build up in the your body and be dangerous to your health.    Over-the-counter remedies won't shorten the length of the illness but may be helpful for cough, sore throat; and nasal and sinus congestion. Don't use decongestants if you have high blood pressure.  Follow-up care  Follow up with your healthcare provider if you do not improve over the next week.  Call 911  Call 911 if any of the following occur:    Convulsion    Feeling weak, dizzy, or like you are going to faint    Chest pain, shortness of breath, wheezing, or difficulty breathing  When to seek medical advice  Call your healthcare provider right away if any of these occur:    Cough with lots of colored sputum (mucus) or blood in your sputum    Chest pain, shortness of breath, wheezing, or difficulty breathing    Severe headache; face, neck, or ear pain    Severe, constant pain in the lower right side of your belly (abdominal)    Continued vomiting (cant keep liquids down)    Frequent diarrhea (more than 5 times a day); blood (red or black color) or mucus in diarrhea    Feeling weak, dizzy, or like you  are going to faint    Extreme thirst    Fever of 100.4 F (38 C) or higher, or as directed by your healthcare provider  Date Last Reviewed: 9/25/2015 2000-2017 [employer]. [address] [place] [address], [place] [address]. All rights reserved. This information is not intended as a substitute for professional medical care. Always follow your healthcare professional's instructions.

## 2021-12-10 ENCOUNTER — LAB REQUISITION (OUTPATIENT)
Dept: LAB | Facility: CLINIC | Age: 41
End: 2021-12-10

## 2021-12-10 PROCEDURE — 86481 TB AG RESPONSE T-CELL SUSP: CPT | Performed by: INTERNAL MEDICINE

## 2021-12-12 LAB
GAMMA INTERFERON BACKGROUND BLD IA-ACNC: 0.01 IU/ML
M TB IFN-G BLD-IMP: NEGATIVE
M TB IFN-G CD4+ BCKGRND COR BLD-ACNC: 9.99 IU/ML
MITOGEN IGNF BCKGRD COR BLD-ACNC: 0.01 IU/ML
MITOGEN IGNF BCKGRD COR BLD-ACNC: 0.02 IU/ML
QUANTIFERON MITOGEN: 10 IU/ML
QUANTIFERON NIL TUBE: 0.01 IU/ML
QUANTIFERON TB1 TUBE: 0.03 IU/ML
QUANTIFERON TB2 TUBE: 0.02

## 2022-03-31 ENCOUNTER — OFFICE VISIT (OUTPATIENT)
Dept: FAMILY MEDICINE | Facility: CLINIC | Age: 42
End: 2022-03-31
Payer: COMMERCIAL

## 2022-03-31 VITALS
BODY MASS INDEX: 69.16 KG/M2 | HEART RATE: 94 BPM | WEIGHT: 293 LBS | OXYGEN SATURATION: 96 % | SYSTOLIC BLOOD PRESSURE: 119 MMHG | DIASTOLIC BLOOD PRESSURE: 78 MMHG

## 2022-03-31 DIAGNOSIS — F41.1 ANXIETY STATE: ICD-10-CM

## 2022-03-31 DIAGNOSIS — E66.01 MORBID OBESITY (H): ICD-10-CM

## 2022-03-31 DIAGNOSIS — F33.41 RECURRENT MAJOR DEPRESSIVE DISORDER, IN PARTIAL REMISSION (H): ICD-10-CM

## 2022-03-31 DIAGNOSIS — R73.03 PREDIABETES: Primary | ICD-10-CM

## 2022-03-31 LAB
ALBUMIN SERPL-MCNC: 3.7 G/DL (ref 3.5–5)
ALP SERPL-CCNC: 130 U/L (ref 45–120)
ALT SERPL W P-5'-P-CCNC: 18 U/L (ref 0–45)
ANION GAP SERPL CALCULATED.3IONS-SCNC: 9 MMOL/L (ref 5–18)
AST SERPL W P-5'-P-CCNC: 12 U/L (ref 0–40)
BILIRUB SERPL-MCNC: 0.4 MG/DL (ref 0–1)
BUN SERPL-MCNC: 8 MG/DL (ref 8–22)
CALCIUM SERPL-MCNC: 10 MG/DL (ref 8.5–10.5)
CHLORIDE BLD-SCNC: 103 MMOL/L (ref 98–107)
CO2 SERPL-SCNC: 29 MMOL/L (ref 22–31)
CREAT SERPL-MCNC: 0.81 MG/DL (ref 0.6–1.1)
GFR SERPL CREATININE-BSD FRML MDRD: >90 ML/MIN/1.73M2
GLUCOSE BLD-MCNC: 115 MG/DL (ref 70–125)
HBA1C MFR BLD: 6.2 % (ref 0–5.6)
LDLC SERPL CALC-MCNC: 123 MG/DL
POTASSIUM BLD-SCNC: 4.6 MMOL/L (ref 3.5–5)
PROT SERPL-MCNC: 7.4 G/DL (ref 6–8)
SODIUM SERPL-SCNC: 141 MMOL/L (ref 136–145)

## 2022-03-31 PROCEDURE — 80053 COMPREHEN METABOLIC PANEL: CPT | Performed by: NURSE PRACTITIONER

## 2022-03-31 PROCEDURE — 99213 OFFICE O/P EST LOW 20 MIN: CPT | Performed by: NURSE PRACTITIONER

## 2022-03-31 PROCEDURE — 36415 COLL VENOUS BLD VENIPUNCTURE: CPT | Performed by: NURSE PRACTITIONER

## 2022-03-31 PROCEDURE — 83721 ASSAY OF BLOOD LIPOPROTEIN: CPT | Performed by: NURSE PRACTITIONER

## 2022-03-31 PROCEDURE — 83036 HEMOGLOBIN GLYCOSYLATED A1C: CPT | Performed by: NURSE PRACTITIONER

## 2022-03-31 RX ORDER — GUANFACINE 2 MG/1
2 TABLET, EXTENDED RELEASE ORAL
COMMUNITY
Start: 2021-11-20

## 2022-03-31 NOTE — PROGRESS NOTES
Assessment and Plan:       ICD-10-CM    1. Prediabetes  R73.03 Hemoglobin A1c     Comprehensive metabolic panel (BMP + Alb, Alk Phos, ALT, AST, Total. Bili, TP)     LDL cholesterol direct   2. Morbid obesity (H)  E66.01 Comprehensive Weight Management   3. Recurrent major depressive disorder, in partial remission (H)  F33.41    4. Anxiety  F41.1      Patient has a history of prediabetes.  She had an A1c result of 6.7% in December.  She did not follow-up for repeat A1c for diabetes confirmation.  We will recheck today.  If she does have diabetes, she is interested in injectables to assist with weight loss.  Will refer to diabetes education.  Patient is interested in seeing the bariatric center for possible surgery versus medical management weight loss.  Referral was placed.  She continues to see psychiatry for depression and anxiety.  Symptoms are managed with venlafaxine and guanfacine.  She is content with the plan.    Subjective:     Cathi is a 41 year old female presenting to the clinic for concerns for diabetes.  Patient was seen previously at [facility] where she had an A1c of 6.7% on 12/2/2021.  She did not have a second A1c to confirm diabetes.  She has a history of taking Metformin to assist with weight loss and prediabetes, but this caused diarrhea.  She has a history of losing 100 pounds in the past.  She has not maintained her weight loss and gradually gained weight while she was taking Abilify.  She has a history of PTSD, bipolar disorder, depression.  She also has a history of methamphetamine and cocaine abuse.  She has been in remission for 3 years.  She continues to see psychiatry where she is prescribed guanfacine and venlafaxine.  Patient is interested in bariatric procedures.    Reviewof Systems: A complete 14 point review of systems was obtained and is negative or as stated in the history of present illness.    Social History     Socioeconomic History     Marital status: Single     Spouse name: Not  on file     Number of children: Not on file     Years of education: Not on file     Highest education level: Not on file   Occupational History     Not on file   Tobacco Use     Smoking status: Current Every Day Smoker     Packs/day: 0.50     Years: 17.00     Pack years: 8.50     Types: Cigarettes, Cigarettes     Smokeless tobacco: Never Used   Substance and Sexual Activity     Alcohol use: Yes     Comment: Alcoholic Drinks/day: social      Drug use: No     Sexual activity: Yes     Partners: Male   Other Topics Concern     Parent/sibling w/ CABG, MI or angioplasty before 65F 55M? Not Asked   Social History Narrative     Not on file     Social Determinants of Health     Financial Resource Strain: Not on file   Food Insecurity: Not on file   Transportation Needs: Not on file   Physical Activity: Not on file   Stress: Not on file   Social Connections: Not on file   Intimate Partner Violence: Not on file   Housing Stability: Not on file       Active Ambulatory Problems     Diagnosis Date Noted     Vitamin D Deficiency      Acute reaction to stress      Anxiety      Headache      Increased BMI      Uses birth control 06/20/2016     Thoracic back pain 06/26/2016     History of posttraumatic stress disorder (PTSD) 10/20/2016     S/P cholecystectomy      Recurrent major depressive disorder, in partial remission (H) 01/19/2018     Resolved Ambulatory Problems     Diagnosis Date Noted     Borderline personality disorder (H)      Cocaine abuse (H)      Past Medical History:   Diagnosis Date     Abnormal Pap smear of cervix      Acute cholecystitis 8/27/2017     Anal fissure      Bipolar disorder (H)      Calculus of kidney      Cholecystitis 8/27/2017     H/O borderline personality disorder 10/21/2016     Obese        Family History   Problem Relation Age of Onset     Substance Abuse Mother      Mental Illness Mother      Substance Abuse Father      Mental Illness Father      Anxiety Disorder Maternal Grandmother       Substance Abuse Paternal Grandfather        Objective:     /78 (BP Location: Left arm, Patient Position: Sitting, Cuff Size: Adult Large)   Pulse 94   Wt (!) 182.8 kg (402 lb 14.4 oz)   SpO2 96%   BMI 69.16 kg/m      Patient is alert, in no obvious distress.   Skin: Warm, dry.    Neck: Supple, no lymphadenopathy, JVD, bruits noted.  No thyromegaly.  Lungs:  Clear to auscultation. Respirations even and unlabored.  No wheezing or rales noted.   Heart:  Regular rate and rhythm.  No murmurs, S3, S4, gallops, or rubs.

## 2022-04-01 ASSESSMENT — PATIENT HEALTH QUESTIONNAIRE - PHQ9: SUM OF ALL RESPONSES TO PHQ QUESTIONS 1-9: 11

## 2022-05-02 DIAGNOSIS — R73.03 PREDIABETES: Primary | ICD-10-CM

## 2022-06-10 ENCOUNTER — LAB (OUTPATIENT)
Dept: FAMILY MEDICINE | Facility: CLINIC | Age: 42
End: 2022-06-10
Attending: FAMILY MEDICINE
Payer: COMMERCIAL

## 2022-06-10 DIAGNOSIS — Z20.822 SUSPECTED 2019 NOVEL CORONAVIRUS INFECTION: ICD-10-CM

## 2022-06-10 PROCEDURE — U0005 INFEC AGEN DETEC AMPLI PROBE: HCPCS

## 2022-06-10 PROCEDURE — 99207 PR NO CHARGE LOS: CPT

## 2022-06-10 PROCEDURE — U0003 INFECTIOUS AGENT DETECTION BY NUCLEIC ACID (DNA OR RNA); SEVERE ACUTE RESPIRATORY SYNDROME CORONAVIRUS 2 (SARS-COV-2) (CORONAVIRUS DISEASE [COVID-19]), AMPLIFIED PROBE TECHNIQUE, MAKING USE OF HIGH THROUGHPUT TECHNOLOGIES AS DESCRIBED BY CMS-2020-01-R: HCPCS

## 2022-06-11 LAB — SARS-COV-2 RNA RESP QL NAA+PROBE: NEGATIVE

## 2022-06-27 ENCOUNTER — OFFICE VISIT (OUTPATIENT)
Dept: FAMILY MEDICINE | Facility: CLINIC | Age: 42
End: 2022-06-27
Payer: COMMERCIAL

## 2022-06-27 VITALS
BODY MASS INDEX: 50.02 KG/M2 | HEIGHT: 64 IN | TEMPERATURE: 98.1 F | DIASTOLIC BLOOD PRESSURE: 72 MMHG | OXYGEN SATURATION: 100 % | WEIGHT: 293 LBS | SYSTOLIC BLOOD PRESSURE: 130 MMHG | RESPIRATION RATE: 16 BRPM | HEART RATE: 81 BPM

## 2022-06-27 DIAGNOSIS — R42 DIZZINESS: Primary | ICD-10-CM

## 2022-06-27 DIAGNOSIS — R63.8 INCREASED BMI: ICD-10-CM

## 2022-06-27 DIAGNOSIS — R35.0 URINARY FREQUENCY: ICD-10-CM

## 2022-06-27 DIAGNOSIS — R73.03 PREDIABETES: ICD-10-CM

## 2022-06-27 LAB
ALBUMIN UR-MCNC: NEGATIVE MG/DL
APPEARANCE UR: CLEAR
BASOPHILS # BLD AUTO: 0 10E3/UL (ref 0–0.2)
BASOPHILS NFR BLD AUTO: 1 %
BILIRUB UR QL STRIP: NEGATIVE
COLOR UR AUTO: YELLOW
EOSINOPHIL # BLD AUTO: 0.2 10E3/UL (ref 0–0.7)
EOSINOPHIL NFR BLD AUTO: 3 %
ERYTHROCYTE [DISTWIDTH] IN BLOOD BY AUTOMATED COUNT: 16.4 % (ref 10–15)
GLUCOSE UR STRIP-MCNC: NEGATIVE MG/DL
HBA1C MFR BLD: 6.9 % (ref 0–5.6)
HCT VFR BLD AUTO: 41 % (ref 35–47)
HGB BLD-MCNC: 13 G/DL (ref 11.7–15.7)
HGB UR QL STRIP: NEGATIVE
KETONES UR STRIP-MCNC: NEGATIVE MG/DL
LEUKOCYTE ESTERASE UR QL STRIP: NEGATIVE
LYMPHOCYTES # BLD AUTO: 1.6 10E3/UL (ref 0.8–5.3)
LYMPHOCYTES NFR BLD AUTO: 19 %
MCH RBC QN AUTO: 25.4 PG (ref 26.5–33)
MCHC RBC AUTO-ENTMCNC: 31.7 G/DL (ref 31.5–36.5)
MCV RBC AUTO: 80 FL (ref 78–100)
MONOCYTES # BLD AUTO: 0.4 10E3/UL (ref 0–1.3)
MONOCYTES NFR BLD AUTO: 5 %
NEUTROPHILS # BLD AUTO: 6.4 10E3/UL (ref 1.6–8.3)
NEUTROPHILS NFR BLD AUTO: 73 %
NITRATE UR QL: NEGATIVE
PH UR STRIP: 7 [PH] (ref 5–7)
PLATELET # BLD AUTO: 331 10E3/UL (ref 150–450)
RBC # BLD AUTO: 5.11 10E6/UL (ref 3.8–5.2)
SP GR UR STRIP: 1.02 (ref 1–1.03)
UROBILINOGEN UR STRIP-ACNC: 0.2 E.U./DL
WBC # BLD AUTO: 8.8 10E3/UL (ref 4–11)

## 2022-06-27 PROCEDURE — 84443 ASSAY THYROID STIM HORMONE: CPT | Performed by: FAMILY MEDICINE

## 2022-06-27 PROCEDURE — 81003 URINALYSIS AUTO W/O SCOPE: CPT | Performed by: FAMILY MEDICINE

## 2022-06-27 PROCEDURE — 85025 COMPLETE CBC W/AUTO DIFF WBC: CPT | Performed by: FAMILY MEDICINE

## 2022-06-27 PROCEDURE — 80048 BASIC METABOLIC PNL TOTAL CA: CPT | Performed by: FAMILY MEDICINE

## 2022-06-27 PROCEDURE — 99214 OFFICE O/P EST MOD 30 MIN: CPT | Performed by: FAMILY MEDICINE

## 2022-06-27 PROCEDURE — 83036 HEMOGLOBIN GLYCOSYLATED A1C: CPT | Performed by: FAMILY MEDICINE

## 2022-06-27 PROCEDURE — 36415 COLL VENOUS BLD VENIPUNCTURE: CPT | Performed by: FAMILY MEDICINE

## 2022-06-27 RX ORDER — MECLIZINE HYDROCHLORIDE 25 MG/1
25 TABLET ORAL 3 TIMES DAILY PRN
Qty: 30 TABLET | Refills: 0 | Status: SHIPPED | OUTPATIENT
Start: 2022-06-27

## 2022-06-27 ASSESSMENT — PATIENT HEALTH QUESTIONNAIRE - PHQ9
10. IF YOU CHECKED OFF ANY PROBLEMS, HOW DIFFICULT HAVE THESE PROBLEMS MADE IT FOR YOU TO DO YOUR WORK, TAKE CARE OF THINGS AT HOME, OR GET ALONG WITH OTHER PEOPLE: VERY DIFFICULT
SUM OF ALL RESPONSES TO PHQ QUESTIONS 1-9: 11
SUM OF ALL RESPONSES TO PHQ QUESTIONS 1-9: 11

## 2022-06-27 ASSESSMENT — PAIN SCALES - GENERAL: PAINLEVEL: MODERATE PAIN (5)

## 2022-06-27 NOTE — PROGRESS NOTES
ICD-10-CM    1. Dizziness  R42 CBC with platelets and differential     UA Macro with Reflex to Micro and Culture - lab collect     CBC with platelets and differential     UA Macro with Reflex to Micro and Culture - lab collect     Physical Therapy Referral     meclizine (ANTIVERT) 25 MG tablet   2. Urinary frequency  R35.0 CBC with platelets and differential     Basic metabolic panel  (Ca, Cl, CO2, Creat, Gluc, K, Na, BUN)     TSH with free T4 reflex     Hemoglobin A1c     CBC with platelets and differential     Basic metabolic panel  (Ca, Cl, CO2, Creat, Gluc, K, Na, BUN)     TSH with free T4 reflex     Hemoglobin A1c   3. Increased BMI  R63.8 Hemoglobin A1c     Hemoglobin A1c   4. Prediabetes  R73.03 Hemoglobin A1c     Hemoglobin A1c     New to his provider and clinic  Here with dizziness that is not slightly improving  Initial labs stable  History of prediabetes-now diabetes mellitus, she reports her hgba1c was worse and lost weight.  She declined starting med.  She has an appoint with diabetes mellitus education.  She is to follow up with pcp as planned, exercises given to do at home.     Urinary freq-checked UA normal today  Close monitoring  Advised recheck in 1-2 weeks    Obesity-working on weight loss, she is to meet with nutritionist and weigh management    diabetes is slightly worse, please work with your provider and diabetes mellitus education as advised.  Recheck labs in 2-3 months    Your initial labs are stable, including urine and cbc    Awaiting other labs    Please follow up with physical therapy  And your provider in 1-2 weeks if not better    Meclizine as needed      Subjective   Cathi is a 41 year old, presenting for the following health issues:  Vertigo      Ongoing knee pain. Less of a concern. Bilateral. No injury to worsen it is just worse over time. Her main concern today is the dizziness.   She checked BP recently and it was elevated. Not elevated today in clinic.     History of  "Present Illness       Reason for visit:  Dizziness, headaches, knee pain, room spinning  Symptom onset:  3-7 days ago  Symptoms include:  Dizziness, headaches, room spinning  Symptom intensity:  Severe  Symptom progression:  Staying the same  Had these symptoms before:  No  What makes it worse:  Laying down and closing my eyes  What makes it better:  No    She eats 0-1 servings of fruits and vegetables daily.She consumes 2 sweetened beverage(s) daily.She exercises with enough effort to increase her heart rate 10 to 19 minutes per day.  She exercises with enough effort to increase her heart rate 3 or less days per week.   She is taking medications regularly.    Today's PHQ-9         PHQ-9 Total Score: 11    PHQ-9 Q9 Thoughts of better off dead/self-harm past 2 weeks :   Not at all    How difficult have these problems made it for you to do your work, take care of things at home, or get along with other people: Very difficult     Thursday-dizziness with laying damion and standing  No new med changes  No new trauma  No hearing issues  Not using any alcohol  No drug use    No history of dizziness  Not flying  No upper respiratory infection issues, no sinus issues  Mild headache    Hydrating well          Review of Systems   Constitutional, HEENT, cardiovascular, pulmonary, GI, , musculoskeletal, neuro, skin, endocrine and psych systems are negative, except as otherwise noted.      Objective    /72   Pulse 81   Temp 98.1  F (36.7  C) (Oral)   Resp 16   Ht 1.626 m (5' 4\")   Wt (!) 183.3 kg (404 lb)   SpO2 100%   BMI 69.35 kg/m    Body mass index is 69.35 kg/m .  Physical Exam   GENERAL: healthy, alert and no distress  EYES: Eyes grossly normal to inspection, PERRL and conjunctivae and sclerae normal  HENT: ear canals and TM's normal, nose and mouth without ulcers or lesions  NECK: no adenopathy, no asymmetry, masses, or scars and thyroid normal to palpation  RESP: lungs clear to auscultation - no rales, rhonchi " or wheezes  CV: regular rate and rhythm, normal S1 S2, no S3 or S4, no murmur, click or rub, no peripheral edema and peripheral pulses strong  ABDOMEN: soft, nontender, no hepatosplenomegaly, no masses and bowel sounds normal  MS:dizziness with appley mvt,  no gross musculoskeletal defects noted, no edema                .  ..

## 2022-06-27 NOTE — PATIENT INSTRUCTIONS
Your diabetes is slightly worse, please work with your provider and diabetes mellitus education as advised    Your initial labs are stable, including urine and cbc    Awaiting other labs    Please follow up with physical therapy  And your provider in 1-2 weeks if not better    Meclizine as needed  Take care  Nate Antonio D.O.        Patient Education

## 2022-06-29 LAB
ANION GAP SERPL CALCULATED.3IONS-SCNC: 7 MMOL/L (ref 3–14)
BUN SERPL-MCNC: 8 MG/DL (ref 7–30)
CALCIUM SERPL-MCNC: 8.8 MG/DL (ref 8.5–10.1)
CHLORIDE BLD-SCNC: 104 MMOL/L (ref 94–109)
CO2 SERPL-SCNC: 26 MMOL/L (ref 20–32)
CREAT SERPL-MCNC: 0.6 MG/DL (ref 0.52–1.04)
GFR SERPL CREATININE-BSD FRML MDRD: >90 ML/MIN/1.73M2
GLUCOSE BLD-MCNC: 106 MG/DL (ref 70–99)
POTASSIUM BLD-SCNC: 4.1 MMOL/L (ref 3.4–5.3)
SODIUM SERPL-SCNC: 137 MMOL/L (ref 133–144)
TSH SERPL DL<=0.005 MIU/L-ACNC: 2.1 MU/L (ref 0.4–4)

## 2022-07-02 ENCOUNTER — HEALTH MAINTENANCE LETTER (OUTPATIENT)
Age: 42
End: 2022-07-02

## 2022-07-11 ENCOUNTER — HOSPITAL ENCOUNTER (EMERGENCY)
Facility: CLINIC | Age: 42
Discharge: HOME | End: 2022-07-11
Attending: EMERGENCY MEDICINE | Admitting: EMERGENCY MEDICINE
Payer: COMMERCIAL

## 2022-07-11 ENCOUNTER — APPOINTMENT (OUTPATIENT)
Dept: CT IMAGING | Facility: CLINIC | Age: 42
End: 2022-07-11
Attending: EMERGENCY MEDICINE
Payer: COMMERCIAL

## 2022-07-11 VITALS
DIASTOLIC BLOOD PRESSURE: 78 MMHG | HEIGHT: 64 IN | BODY MASS INDEX: 50.02 KG/M2 | HEART RATE: 69 BPM | RESPIRATION RATE: 16 BRPM | WEIGHT: 293 LBS | OXYGEN SATURATION: 99 % | SYSTOLIC BLOOD PRESSURE: 128 MMHG | TEMPERATURE: 98.3 F

## 2022-07-11 DIAGNOSIS — M54.50 ACUTE RIGHT-SIDED LOW BACK PAIN WITHOUT SCIATICA: ICD-10-CM

## 2022-07-11 LAB
ALBUMIN SERPL-MCNC: 3.4 G/DL (ref 3.4–5)
ALBUMIN UR-MCNC: 20 MG/DL
ALP SERPL-CCNC: 132 U/L (ref 40–150)
ALT SERPL W P-5'-P-CCNC: 24 U/L (ref 0–50)
ANION GAP SERPL CALCULATED.3IONS-SCNC: 5 MMOL/L (ref 3–14)
APPEARANCE UR: ABNORMAL
AST SERPL W P-5'-P-CCNC: 15 U/L (ref 0–45)
BACTERIA #/AREA URNS HPF: ABNORMAL /HPF
BASOPHILS # BLD AUTO: 0.1 10E3/UL (ref 0–0.2)
BASOPHILS NFR BLD AUTO: 1 %
BILIRUB SERPL-MCNC: 0.4 MG/DL (ref 0.2–1.3)
BILIRUB UR QL STRIP: NEGATIVE
BUN SERPL-MCNC: 10 MG/DL (ref 7–30)
CALCIUM SERPL-MCNC: 9.1 MG/DL (ref 8.5–10.1)
CHLORIDE BLD-SCNC: 105 MMOL/L (ref 94–109)
CO2 SERPL-SCNC: 28 MMOL/L (ref 20–32)
COLOR UR AUTO: YELLOW
CREAT SERPL-MCNC: 0.78 MG/DL (ref 0.52–1.04)
EOSINOPHIL # BLD AUTO: 0.3 10E3/UL (ref 0–0.7)
EOSINOPHIL NFR BLD AUTO: 4 %
ERYTHROCYTE [DISTWIDTH] IN BLOOD BY AUTOMATED COUNT: 15.7 % (ref 10–15)
FLUAV RNA SPEC QL NAA+PROBE: NEGATIVE
FLUBV RNA RESP QL NAA+PROBE: NEGATIVE
GFR SERPL CREATININE-BSD FRML MDRD: >90 ML/MIN/1.73M2
GLUCOSE BLD-MCNC: 98 MG/DL (ref 70–99)
GLUCOSE UR STRIP-MCNC: NEGATIVE MG/DL
HCG SERPL QL: NEGATIVE
HCT VFR BLD AUTO: 38.4 % (ref 35–47)
HGB BLD-MCNC: 12 G/DL (ref 11.7–15.7)
HGB UR QL STRIP: ABNORMAL
IMM GRANULOCYTES # BLD: 0 10E3/UL
IMM GRANULOCYTES NFR BLD: 0 %
KETONES UR STRIP-MCNC: NEGATIVE MG/DL
LEUKOCYTE ESTERASE UR QL STRIP: ABNORMAL
LIPASE SERPL-CCNC: 104 U/L (ref 73–393)
LYMPHOCYTES # BLD AUTO: 1.9 10E3/UL (ref 0.8–5.3)
LYMPHOCYTES NFR BLD AUTO: 25 %
MCH RBC QN AUTO: 25.3 PG (ref 26.5–33)
MCHC RBC AUTO-ENTMCNC: 31.3 G/DL (ref 31.5–36.5)
MCV RBC AUTO: 81 FL (ref 78–100)
MONOCYTES # BLD AUTO: 0.4 10E3/UL (ref 0–1.3)
MONOCYTES NFR BLD AUTO: 6 %
MUCOUS THREADS #/AREA URNS LPF: PRESENT /LPF
NEUTROPHILS # BLD AUTO: 4.7 10E3/UL (ref 1.6–8.3)
NEUTROPHILS NFR BLD AUTO: 64 %
NITRATE UR QL: NEGATIVE
NRBC # BLD AUTO: 0 10E3/UL
NRBC BLD AUTO-RTO: 0 /100
PH UR STRIP: 6 [PH] (ref 5–7)
PLATELET # BLD AUTO: 289 10E3/UL (ref 150–450)
POTASSIUM BLD-SCNC: 3.8 MMOL/L (ref 3.4–5.3)
PROT SERPL-MCNC: 7.6 G/DL (ref 6.8–8.8)
RBC # BLD AUTO: 4.75 10E6/UL (ref 3.8–5.2)
RBC URINE: 5 /HPF
SARS-COV-2 RNA RESP QL NAA+PROBE: NEGATIVE
SODIUM SERPL-SCNC: 138 MMOL/L (ref 133–144)
SP GR UR STRIP: 1.03 (ref 1–1.03)
SQUAMOUS EPITHELIAL: 24 /HPF
UROBILINOGEN UR STRIP-MCNC: NORMAL MG/DL
WBC # BLD AUTO: 7.4 10E3/UL (ref 4–11)
WBC URINE: 3 /HPF

## 2022-07-11 PROCEDURE — 99285 EMERGENCY DEPT VISIT HI MDM: CPT | Performed by: EMERGENCY MEDICINE

## 2022-07-11 PROCEDURE — 87086 URINE CULTURE/COLONY COUNT: CPT | Performed by: EMERGENCY MEDICINE

## 2022-07-11 PROCEDURE — 96374 THER/PROPH/DIAG INJ IV PUSH: CPT

## 2022-07-11 PROCEDURE — 250N000013 HC RX MED GY IP 250 OP 250 PS 637: Performed by: EMERGENCY MEDICINE

## 2022-07-11 PROCEDURE — C9803 HOPD COVID-19 SPEC COLLECT: HCPCS

## 2022-07-11 PROCEDURE — 99285 EMERGENCY DEPT VISIT HI MDM: CPT | Mod: 25

## 2022-07-11 PROCEDURE — 96361 HYDRATE IV INFUSION ADD-ON: CPT

## 2022-07-11 PROCEDURE — 258N000003 HC RX IP 258 OP 636: Performed by: EMERGENCY MEDICINE

## 2022-07-11 PROCEDURE — 250N000011 HC RX IP 250 OP 636: Performed by: EMERGENCY MEDICINE

## 2022-07-11 PROCEDURE — 96375 TX/PRO/DX INJ NEW DRUG ADDON: CPT

## 2022-07-11 PROCEDURE — 87636 SARSCOV2 & INF A&B AMP PRB: CPT | Performed by: EMERGENCY MEDICINE

## 2022-07-11 PROCEDURE — 80053 COMPREHEN METABOLIC PANEL: CPT | Performed by: EMERGENCY MEDICINE

## 2022-07-11 PROCEDURE — 74176 CT ABD & PELVIS W/O CONTRAST: CPT

## 2022-07-11 PROCEDURE — 250N000009 HC RX 250: Performed by: EMERGENCY MEDICINE

## 2022-07-11 PROCEDURE — 81003 URINALYSIS AUTO W/O SCOPE: CPT | Performed by: EMERGENCY MEDICINE

## 2022-07-11 PROCEDURE — 36415 COLL VENOUS BLD VENIPUNCTURE: CPT | Performed by: EMERGENCY MEDICINE

## 2022-07-11 PROCEDURE — 85025 COMPLETE CBC W/AUTO DIFF WBC: CPT | Performed by: EMERGENCY MEDICINE

## 2022-07-11 PROCEDURE — 84703 CHORIONIC GONADOTROPIN ASSAY: CPT | Performed by: EMERGENCY MEDICINE

## 2022-07-11 PROCEDURE — 83690 ASSAY OF LIPASE: CPT | Performed by: EMERGENCY MEDICINE

## 2022-07-11 RX ORDER — HYDROMORPHONE HYDROCHLORIDE 1 MG/ML
0.5 INJECTION, SOLUTION INTRAMUSCULAR; INTRAVENOUS; SUBCUTANEOUS ONCE
Status: COMPLETED | OUTPATIENT
Start: 2022-07-11 | End: 2022-07-11

## 2022-07-11 RX ORDER — VENLAFAXINE HYDROCHLORIDE 225 MG/1
TABLET, EXTENDED RELEASE ORAL
COMMUNITY
Start: 2019-01-10

## 2022-07-11 RX ORDER — ONDANSETRON 2 MG/ML
4 INJECTION INTRAMUSCULAR; INTRAVENOUS ONCE
Status: COMPLETED | OUTPATIENT
Start: 2022-07-11 | End: 2022-07-11

## 2022-07-11 RX ORDER — SODIUM CHLORIDE 9 MG/ML
INJECTION, SOLUTION INTRAVENOUS CONTINUOUS
Status: DISCONTINUED | OUTPATIENT
Start: 2022-07-11 | End: 2022-07-12 | Stop reason: HOSPADM

## 2022-07-11 RX ORDER — CYCLOBENZAPRINE HCL 10 MG
10 TABLET ORAL ONCE
Status: COMPLETED | OUTPATIENT
Start: 2022-07-11 | End: 2022-07-11

## 2022-07-11 RX ORDER — KETOROLAC TROMETHAMINE 15 MG/ML
15 INJECTION, SOLUTION INTRAMUSCULAR; INTRAVENOUS ONCE
Status: COMPLETED | OUTPATIENT
Start: 2022-07-11 | End: 2022-07-11

## 2022-07-11 RX ORDER — SCOLOPAMINE TRANSDERMAL SYSTEM 1 MG/1
1 PATCH, EXTENDED RELEASE TRANSDERMAL
Status: DISCONTINUED | OUTPATIENT
Start: 2022-07-11 | End: 2022-07-12 | Stop reason: HOSPADM

## 2022-07-11 RX ORDER — CYCLOBENZAPRINE HCL 10 MG
10 TABLET ORAL 3 TIMES DAILY PRN
Qty: 20 TABLET | Refills: 0 | Status: SHIPPED | OUTPATIENT
Start: 2022-07-11 | End: 2022-07-18

## 2022-07-11 RX ADMIN — ONDANSETRON 4 MG: 2 INJECTION INTRAMUSCULAR; INTRAVENOUS at 17:55

## 2022-07-11 RX ADMIN — KETOROLAC TROMETHAMINE 15 MG: 15 INJECTION, SOLUTION INTRAMUSCULAR; INTRAVENOUS at 22:28

## 2022-07-11 RX ADMIN — SODIUM CHLORIDE: 9 INJECTION, SOLUTION INTRAVENOUS at 17:56

## 2022-07-11 RX ADMIN — SCOPALAMINE 1 PATCH: 1 PATCH, EXTENDED RELEASE TRANSDERMAL at 18:24

## 2022-07-11 RX ADMIN — CYCLOBENZAPRINE 10 MG: 10 TABLET, FILM COATED ORAL at 21:18

## 2022-07-11 RX ADMIN — HYDROMORPHONE HYDROCHLORIDE 0.5 MG: 1 INJECTION, SOLUTION INTRAMUSCULAR; INTRAVENOUS; SUBCUTANEOUS at 17:55

## 2022-07-11 RX ADMIN — SODIUM CHLORIDE 1000 ML: 9 INJECTION, SOLUTION INTRAVENOUS at 16:59

## 2022-07-11 NOTE — LETTER
July 11, 2022      To Whom It May Concern:      Natalie J Fritsch was seen in our Emergency Department today, 07/11/22.  I expect her condition to improve over the next 3 days.  She may return to work/school when improved.    Sincerely,        Francheska Cole MD

## 2022-07-11 NOTE — ED PROVIDER NOTES
[facility] [place] EMERGENCY DEPARTMENT ([facility])    7/11/22     ED 5    History     Chief Complaint   Patient presents with     Flank Pain     R side, started around 1130am today. Nausea, no vomiting     The history is provided by the patient and medical records.     Natalie J Fritsch is a 41 year old female with prior history of diabetes, kidney stones, bipolar disorder, prior laparoscopic cholecystectomy who presents with right sided back pain and nausea.  Patient reports right lower back pain that radiates around to the right abdomen area and reports this started around 11:30 AM today.  She states that it started to get more severe and feels similar to her prior kidney stones and thus presented here today.  She took 800 mg of ibuprofen prior to coming in.  She does feel this helped her pain somewhat.  She states that she did previously have what sounds like lithotripsy with a kidney stone in the past but is also passed kidney stone spontaneously.  She reports she is having some urinary frequency but no dysuria or known hematuria.  She states that she has felt somewhat chilled last night but no known fevers.  Reports some nausea but no vomiting.  No diarrhea.  States she has been having some room spinning dizziness for the past couple weeks.  Saw her primary care provider for this and was prescribed meclizine.  States that she was exposed to COVID-19 around June 23.  She states she was tested on 28 June and was negative at that time.  She states she has been feeling very fatigued and feeling like she has some brain fog and is questioning whether she may have had COVID-19.  She has had a cholecystectomy.  Denies any other abdominal surgeries.  She does report she has had issues with lower back pain related to musculoskeletal issue in the past as well.  This pain does not radiate into the legs.  No numbness, tingling, or weakness.  No saddle anesthesia.  No bowel or bladder incontinence.  No known injury.  She  denies any chest pain or shortness of breath.  States she has had a mild cough that has been dry.  No history of cardiac disease.  No history of DVT or PE.  No abnormal vaginal discharge.  She does report the pain worsens somewhat with movement.  She does smoke tobacco.  States she does have somewhat of a smoker's cough.  No history of IV drug use. She does report that the dizziness is worse with movement. No light headedness or loss of consciousness. No hearing changes or tinnitus. She reports she was told she has fluid behind her ears after seeing her doctor.     I have reviewed the Medications, Allergies, Past Medical and Surgical History, and Social History in the [employer] system.  Past Medical History:   Diagnosis Date     Abnormal Pap smear of cervix     Created by Conversion      Acute cholecystitis 8/27/2017     Anal fissure      Bipolar disorder (H)      Calculus of kidney      Cholecystitis 8/27/2017    Added automatically from request for surgery [id number]     Depressive disorder 2003     Diabetes (H) 4 months ago     H/O borderline personality disorder 10/21/2016     Obese        Past Surgical History:   Procedure Laterality Date     LAPAROSCOPIC CHOLECYSTECTOMY N/A 08/28/2017    Procedure: CHOLECYSTECTOMY, LAPAROSCOPIC;  Surgeon: Vinay Jolly MD;  Location: [facility];  Service:      LITHOTRIPSY       Past medical history, past surgical history, medications, and allergies were reviewed with the patient. Additional pertinent items: None    Family History   Problem Relation Age of Onset     Substance Abuse Mother         Alcohol     Mental Illness Mother         Narcissistic PD and Histrionic PD     Hypertension Mother      Substance Abuse Father         Polysubstance mainly ETOH     Mental Illness Father         PTSD ASPD     Other Cancer Father         Mouth cancer HPV strains for alcohol and tobacco     Depression Father      Anesthesia Reaction Father      Obesity Father      Anxiety Disorder  "Maternal Grandmother      Hypertension Maternal Grandmother      Substance Abuse Paternal Grandfather         ETOH     Hypertension Maternal Grandfather      Cerebrovascular Disease Maternal Grandfather      Depression Other        Social History     Tobacco Use     Smoking status: Current Every Day Smoker     Packs/day: 0.50     Years: 17.00     Pack years: 8.50     Types: Cigarettes     Smokeless tobacco: Never Used   Substance Use Topics     Alcohol use: Not Currently     Comment: Alcoholic Drinks/day: social       Social history was reviewed with the patient. Additional pertinent items: None  REVIEW OF SYSTEMS  Pertinent positives and negatives are documented in the HPI. All other systems reviewed and are negative.    Physical Exam   BP: (!) 148/95  Pulse: 85  Temp: 98.1  F (36.7  C)  Resp: 16  Height: 162.6 cm (5' 4\")  Weight: (!) 183.3 kg (404 lb)  SpO2: 98 %      Physical Exam  Vitals reviewed.   Constitutional:       Appearance: She is well-developed.      Comments: Appears painful   HENT:      Head: Normocephalic and atraumatic.      Right Ear: Ear canal normal.      Left Ear: Ear canal normal.      Ears:      Comments: Bilateral TM effusion noted. No erythema or bulging of the TMs.   Eyes:      General: No visual field deficit.     Extraocular Movements: Extraocular movements intact.      Conjunctiva/sclera: Conjunctivae normal.      Pupils: Pupils are equal, round, and reactive to light.   Cardiovascular:      Rate and Rhythm: Normal rate and regular rhythm.      Pulses: Normal pulses.      Heart sounds: Normal heart sounds. No murmur heard.  Pulmonary:      Effort: Pulmonary effort is normal. No respiratory distress.      Breath sounds: Normal breath sounds. No stridor. No wheezing or rales.   Abdominal:      General: Bowel sounds are normal. There is no distension.      Palpations: Abdomen is soft. There is no mass.      Tenderness: There is no right CVA tenderness, left CVA tenderness, guarding or " rebound.      Comments: Very mild RLQ tenderness.    Musculoskeletal:         General: No swelling. Normal range of motion.      Cervical back: Normal range of motion and neck supple. No rigidity.      Comments: Paraspinous muscle spasm with some tenderness in the lower lumbar spine area.  No midline thoracic or lumbar spine tenderness.  Pain with movement of the back.   Skin:     General: Skin is warm and dry.      Capillary Refill: Capillary refill takes less than 2 seconds.      Findings: No rash.   Neurological:      General: No focal deficit present.      Mental Status: She is alert and oriented to person, place, and time.      GCS: GCS eye subscore is 4. GCS verbal subscore is 5. GCS motor subscore is 6.      Cranial Nerves: Cranial nerves 2-12 are intact. No cranial nerve deficit, dysarthria or facial asymmetry.      Sensory: Sensation is intact. No sensory deficit.      Motor: No weakness, abnormal muscle tone or pronator drift.      Coordination: Coordination normal. Finger-Nose-Finger Test normal.      Comments: 5 out of 5 strength in all 4 extremities bilaterally.  Sensation intact to light touch in all 4 extremities bilaterally.         ED Course     5:37 PM The patient was seen and examined by Dr. Cole.         Procedures       The medical record was reviewed and interpreted.  Current labs reviewed and interpreted.  Current images reviewed and interpreted: as below.  Managed outpatient prescription medications.         Results for orders placed or performed during the hospital encounter of 07/11/22 (from the past 24 hour(s))   Comprehensive metabolic panel   Result Value Ref Range    Sodium 138 133 - 144 mmol/L    Potassium 3.8 3.4 - 5.3 mmol/L    Chloride 105 94 - 109 mmol/L    Carbon Dioxide (CO2) 28 20 - 32 mmol/L    Anion Gap 5 3 - 14 mmol/L    Urea Nitrogen 10 7 - 30 mg/dL    Creatinine 0.78 0.52 - 1.04 mg/dL    Calcium 9.1 8.5 - 10.1 mg/dL    Glucose 98 70 - 99 mg/dL    Alkaline Phosphatase  132 40 - 150 U/L    AST 15 0 - 45 U/L    ALT 24 0 - 50 U/L    Protein Total 7.6 6.8 - 8.8 g/dL    Albumin 3.4 3.4 - 5.0 g/dL    Bilirubin Total 0.4 0.2 - 1.3 mg/dL    GFR Estimate >90 >60 mL/min/1.73m2   Lipase   Result Value Ref Range    Lipase 104 73 - 393 U/L   HCG qualitative Blood   Result Value Ref Range    hCG Serum Qualitative Negative Negative   CBC with platelets differential    Narrative    The following orders were created for panel order CBC with platelets differential.  Procedure                               Abnormality         Status                     ---------                               -----------         ------                     CBC with platelets and d...[[id number]]  Abnormal            Final result                 Please view results for these tests on the individual orders.   CBC with platelets and differential   Result Value Ref Range    WBC Count 7.4 4.0 - 11.0 10e3/uL    RBC Count 4.75 3.80 - 5.20 10e6/uL    Hemoglobin 12.0 11.7 - 15.7 g/dL    Hematocrit 38.4 35.0 - 47.0 %    MCV 81 78 - 100 fL    MCH 25.3 (L) 26.5 - 33.0 pg    MCHC 31.3 (L) 31.5 - 36.5 g/dL    RDW 15.7 (H) 10.0 - 15.0 %    Platelet Count 289 150 - 450 10e3/uL    % Neutrophils 64 %    % Lymphocytes 25 %    % Monocytes 6 %    % Eosinophils 4 %    % Basophils 1 %    % Immature Granulocytes 0 %    NRBCs per 100 WBC 0 <1 /100    Absolute Neutrophils 4.7 1.6 - 8.3 10e3/uL    Absolute Lymphocytes 1.9 0.8 - 5.3 10e3/uL    Absolute Monocytes 0.4 0.0 - 1.3 10e3/uL    Absolute Eosinophils 0.3 0.0 - 0.7 10e3/uL    Absolute Basophils 0.1 0.0 - 0.2 10e3/uL    Absolute Immature Granulocytes 0.0 <=0.4 10e3/uL    Absolute NRBCs 0.0 10e3/uL   Symptomatic; Unknown Influenza A/B & SARS-CoV2 (COVID-19) Virus PCR Multiplex Nasopharyngeal    Specimen: Nasopharyngeal; Swab   Result Value Ref Range    Influenza A PCR Negative Negative    Influenza B PCR Negative Negative    SARS CoV2 PCR Negative Negative    Narrative    Testing was  performed using the [employer]enrick SARS-CoV-2 & Influenza A/B Assay on the [employer] System. This test should be ordered for the detection of SARS-CoV-2 and influenza viruses in individuals who meet clinical and/or epidemiological criteria. Test performance is unknown in asymptomatic patients. This test is for in vitro diagnostic use under the FDA EUA for laboratories certified under CLIA to perform moderate and/or high complexity testing. This test has not been FDA cleared or approved. A negative result does not rule out the presence of PCR inhibitors in the specimen or target RNA in concentration below the limit of detection for the assay. If only one viral target is positive but coinfection with multiple targets is suspected, the sample should be re-tested with another FDA cleared, approved or authorized test, if coinfection would change clinical management. [facility] [employer] are certified under the Clinical Laboratory Improvement Amendments of 1988 (CLIA-88) as  qualified to perform moderate and/or high complexity laboratory testing.   UA with Microscopic reflex to Culture    Specimen: Urine, Midstream   Result Value Ref Range    Color Urine Yellow Colorless, Straw, Light Yellow, Yellow    Appearance Urine Slightly Cloudy (A) Clear    Glucose Urine Negative Negative mg/dL    Bilirubin Urine Negative Negative    Ketones Urine Negative Negative mg/dL    Specific Gravity Urine 1.030 1.003 - 1.035    Blood Urine Small (A) Negative    pH Urine 6.0 5.0 - 7.0    Protein Albumin Urine 20  (A) Negative mg/dL    Urobilinogen Urine Normal Normal, 2.0 mg/dL    Nitrite Urine Negative Negative    Leukocyte Esterase Urine Small (A) Negative    Bacteria Urine Moderate (A) None Seen /HPF    Mucus Urine Present (A) None Seen /LPF    RBC Urine 5 (H) <=2 /HPF    WBC Urine 3 <=5 /HPF    Squamous Epithelials Urine 24 (H) <=1 /HPF    Narrative    Urine Culture not indicated   CT Abdomen Pelvis w/o Contrast    Narrative    EXAM:  CT ABDOMEN PELVIS W/O CONTRAST  LOCATION: [facility]  DATE/TIME: 7/11/2022 7:31 PM    INDICATION: Abdominal pain. Right flank pain radiating into right abdomen. History of kidney stone.  COMPARISON: CT abdomen pelvis 2/13/2018 reviewed.  TECHNIQUE: CT scan of the abdomen and pelvis was performed without IV contrast. Multiplanar reformats were obtained. Dose reduction techniques were used.  CONTRAST: None.    FINDINGS:   LOWER CHEST: Normal.    HEPATOBILIARY: Diffuse hepatic steatosis. Cholecystectomy.    PANCREAS: Normal.    SPLEEN: Normal.    ADRENAL GLANDS: Normal.    KIDNEYS/BLADDER: Normal. No urinary stone or hydronephrosis.    BOWEL: Normal. No acute findings. No evidence of diverticulitis or colitis. Normal appendix. No evidence of bowel obstruction. Moderate volume stool scattered throughout the colon.    LYMPH NODES: Few prominent RLQ mesenteric lymph nodes, slightly larger than 2/13/2018.    VASCULATURE: Unremarkable.    PELVIC ORGANS: Normal, including normal sized ovaries.    MUSCULOSKELETAL: Moderate scattered degenerative changes in the spine.      Impression    IMPRESSION:   1. Few prominent RLQ mesenteric lymph nodes are slightly larger than 2/13/2018. Findings can be seen in the setting of acute mesenteric adenitis.  2. Otherwise no acute findings in the abdomen or pelvis. No urinary stone. Normal appendix.     Medications   0.9% sodium chloride BOLUS (0 mLs Intravenous Stopped 7/11/22 2224)   HYDROmorphone (PF) (DILAUDID) injection 0.5 mg (0.5 mg Intravenous Given 7/11/22 1755)   ondansetron (ZOFRAN) injection 4 mg (4 mg Intravenous Given 7/11/22 1755)   cyclobenzaprine (FLEXERIL) tablet 10 mg (10 mg Oral Given 7/11/22 2118)   ketorolac (TORADOL) injection 15 mg (15 mg Intravenous Given 7/11/22 2228)              Assessments & Plan (with Medical Decision Making)   Patient presents with right lower back pain radiating around into her abdomen.  She is  concerned this may be a kidney stone.  She does also have prior history of back issues in the lower back as well.  On exam, she appears painful but is in no acute distress.  Her vital signs are within normal limits and she is afebrile.  She does not have any CVA tenderness.  This area of pain is in the right lower back area.  She does not have any neurologic deficits.  No signs or symptoms of spinal cord compression.  Differential diagnosis includes but is not limited to kidney stone, UTI, musculoskeletal etiology such as degenerative disc disease or herniated disc, does not have any signs or symptoms of spinal cord compression and thus felt cauda equina was unlikely, appendicitis, pyelonephritis however felt to be less likely with no CVA tenderness, COVID-19 infection, other viral syndrome, constipation, diverticulitis, ovarian pathology, etc.    Laboratory studies were obtained.  Did obtain a CT of the abdomen and pelvis without contrast for further evaluation.  She was given IV Zofran, IV Dilaudid, and IV fluids.  Has had ongoing dizziness that was already evaluated by her primary care provider.  She does have bilateral effusions of the tabetic membranes which may be a cause of her dizziness symptoms that she describes this as room spinning.  Likely peripheral vertigo.  Does not have any risk factors to suggest posterior CVA and has no signs of cerebellar deficit.  No focal neurologic deficit.  As this has been evaluated and has been somewhat improving with meclizine do not feel this warrants a significant further evaluation at this time.  She is in agreement with this plan.  Did also offer her scopolamine patch which she agreed to as well as feeling somewhat improved with this.  No signs of otitis media.  Do not feel that this is related to the back pain.  Did test for COVID-19 with her exposure and influenza and COVID-19 were negative.  Does appear more comfortable after Dilaudid administration.  Pregnancy test  is negative.  CMP is largely unremarkable with normal renal and liver function.  Lipase is within normal limits making pancreatitis unlikely.  CBC reveals a normal white blood cell count of 7.4 with hemoglobin of 12.  Urinalysis reveals 5 red blood cells with only 3 white blood cells and 24 squamous cells.  This is contaminated.  She has some urinary frequency but no dysuria.  Do not feel that this represents infection at this time.  Urine culture was sent but would not empirically treat her currently with only 3 white blood cells and negative nitrate and only small excite esterase.  The small amount of blood may be representative of a kidney stone.      CT of the abdomen and pelvis Revealed few prominent right lower quadrant mesenteric lymph nodes slightly larger than 2018 and findings can be seen in the setting of acute mesenteric adenitis.  This could potentially be related to her pain.  She has no signs of appendicitis.  No bowel obstruction.  Does have a moderate amount of stool.  Ovaries appear normal.  Thus making ovarian torsion less likely especially with the overall presentation of her symptoms.  She does have moderate scattered degenerative changes in the spine.  No kidney stones present.  Did discuss the possibility she could have passed a stone.  On reevaluation she was initially feeling improved and then her pain was returning somewhat.  Did give her oral Flexeril as I do suspect this may potentially be more related to a musculoskeletal etiology in terms of her back pain.  The abdominal pain could be related to the mesenteric adenitis.  She likely overall has a viral syndrome.  The lungs appeared without any sign of pneumonia in the part seen on CT.  With a normal white blood cell count do not suspect that she has bacterial pneumonia and her main complaints were the abdominal and lower back pain.  The location of the back pain is much lower in the lower lumbar spine making pneumonia very unlikely and  we should see this in the lower lobes which are seen on CT if this was the cause of her pain.  Do not feel she warrants antibiotics for her upper respiratory symptoms at this time.  She does not have any wheezing on exam.  Did also give the patient Toradol as it had been many hours since her last ibuprofen.  Discussed her thoughts on the potential causes of her discomfort.  Advised that we have her follow-up closely with her primary care provider as well as the [facility] as I do feel that this is playing a role in the back pain.  She was given a prescription for Flexeril.  Was advised to continue ibuprofen and Tylenol.  She also was advised to continue the scopolamine patch for 3 days.  She was able to ambulate here.  She was in agreement with wanting discharge at this time and was given strict return precautions to the emergency department.  She voiced understanding.  She was discharged home in stable condition.    I have reviewed the nursing notes.    I have reviewed the findings, diagnosis, plan and need for follow up with the patient.    Discharge Medication List as of 7/11/2022 10:25 PM      START taking these medications    Details   cyclobenzaprine (FLEXERIL) 10 MG tablet Take 1 tablet (10 mg) by mouth 3 times daily as needed for muscle spasms, Disp-20 tablet, R-0, E-Prescribe             Final diagnoses:   Acute right-sided low back pain without sciatica       I, Kaylyn Larkin, am serving as a trained medical scribe to document services personally performed by Francheska Cole MD based on the provider's statements to me on 7/11/22.  This document has been checked and approved by the attending provider.    I, Francheska Cole MD, was physically present and have reviewed and verified the accuracy of this note documented by Kaylyn Larkin, medical scribe.       Francheska Cole MD    7/11/2022   [facility] EMERGENCY DEPARTMENT     Francheska Cole  MD  07/14/22 0139

## 2022-07-11 NOTE — ED TRIAGE NOTES
Pt started having right sided flank pain radiating into right lower abdomen. Pt has history of kidney stones and feels this is similar in presentation. Pt states she felt chilled last night but denies any known fevers. Pt states she has been very exhausted too. Pt exposed to covid-19 around 6/23, tested on 6/28 and was negative. Took Ibuprofen 800mg prior to coming in, feels like this has helped with the pain a little.     Triage Assessment     Row Name 07/11/22 2331       Triage Assessment (Adult)    Airway WDL WDL       Respiratory WDL    Respiratory WDL WDL       Skin Circulation/Temperature WDL    Skin Circulation/Temperature WDL WDL       Cardiac WDL    Cardiac WDL WDL       Peripheral/Neurovascular WDL    Peripheral Neurovascular WDL WDL       Cognitive/Neuro/Behavioral WDL    Cognitive/Neuro/Behavioral WDL WDL

## 2022-07-12 ENCOUNTER — NURSE TRIAGE (OUTPATIENT)
Dept: FAMILY MEDICINE | Facility: CLINIC | Age: 42
End: 2022-07-12

## 2022-07-12 ENCOUNTER — MYC MEDICAL ADVICE (OUTPATIENT)
Dept: FAMILY MEDICINE | Facility: CLINIC | Age: 42
End: 2022-07-12

## 2022-07-12 DIAGNOSIS — N39.0 URINARY TRACT INFECTION: Primary | ICD-10-CM

## 2022-07-12 NOTE — TELEPHONE ENCOUNTER
"Nurse Triage SBAR    Is this a 2nd Level Triage? YES, LICENSED PRACTITIONER REVIEW IS REQUIRED    Situation:   Pt calling with pain in left shoulder and left chest and right abdomen.    Background:   Pt was in ER yesterday with back pain.    Assessment:   Chest pain last over 5 minutes at a time.  Pain is in left shoulder and chest and right side of abdomen.  Pain is severe, she is crying.  She gets short of breath with the pain.  It hurts to take a deep breath.    Protocol Recommended Disposition:   Call  Now    Recommendation:   Provider, I advised patient to call 911 now. She agreed.     Routed to provider This is a FYI to provider.    Does the patient meet one of the following criteria for ADS visit consideration? 16+ years old, with an [facility] PCP     TIP  Providers, please consider if this condition is appropriate for management at one of our Acute and Diagnostic Services sites.     If patient is a good candidate, please use dotphrase <dot>triageresponse and select Refer to ADS to document.      Reason for Disposition    Chest pain lasting longer than 5 minutes and ANY of the following:* Over 45 years old* Over 30 years old and at least one cardiac risk factor (e.g., diabetes, high blood pressure, high cholesterol, smoker, or strong family history of heart disease)* History of heart disease (i.e., angina, heart attack, heart failure, bypass surgery, takes nitroglycerin)* Pain is crushing, pressure-like, or heavy    Additional Information    Negative: Severe difficulty breathing (e.g., struggling for each breath, speaks in single words)    Negative: Passed out (i.e., fainted, collapsed and was not responding)    Negative: Difficult to awaken or acting confused (e.g., disoriented, slurred speech)    Negative: Shock suspected (e.g., cold/pale/clammy skin, too weak to stand, low BP, rapid pulse)    Answer Assessment - Initial Assessment Questions  1. LOCATION: \"Where does it hurt?\"        Left chest and " "shoulder and also pain in right abdomenn  2. RADIATION: \"Does the pain go anywhere else?\" (e.g., into neck, jaw, arms, back)      Radiates in neck  3. ONSET: \"When did the chest pain begin?\" (Minutes, hours or days)       Started today  4. PATTERN \"Does the pain come and go, or has it been constant since it started?\"  \"Does it get worse with exertion?\"       Comes and goes. Comes on with deep breaths and moving  5. DURATION: \"How long does it last\" (e.g., seconds, minutes, hours)      For 1 to 2 minutes  6. SEVERITY: \"How bad is the pain?\"  (e.g., Scale 1-10; mild, moderate, or severe)     - MILD (1-3): doesn't interfere with normal activities      - MODERATE (4-7): interferes with normal activities or awakens from sleep     - SEVERE (8-10): excruciating pain, unable to do any normal activities        Severe  7. CARDIAC RISK FACTORS: \"Do you have any history of heart problems or risk factors for heart disease?\" (e.g., angina, prior heart attack; diabetes, high blood pressure, high cholesterol, smoker, or strong family history of heart disease)      Diabetes, smoker,   8. PULMONARY RISK FACTORS: \"Do you have any history of lung disease?\"  (e.g., blood clots in lung, asthma, emphysema, birth control pills)      No  9. CAUSE: \"What do you think is causing the chest pain?\"      I don't know  10. OTHER SYMPTOMS: \"Do you have any other symptoms?\" (e.g., dizziness, nausea, vomiting, sweating, fever, difficulty breathing, cough)        Dizzy, nauseated, sweating , shortness of breath.  11. PREGNANCY: \"Is there any chance you are pregnant?\" \"When was your last menstrual period?\"        No    Protocols used: CHEST PAIN-A-[place]      "

## 2022-07-12 NOTE — DISCHARGE INSTRUCTIONS
Please make an appointment to follow up with Your Primary Care Provider [facility] ([phone]) as soon as possible.    Can use the flexeril as needed for back spasm. Continue using ibuprofen and tylenol for pain.     Return to the ER if you develop worsening/severe back pain, numbness, numbness in the groin area, weakness, inability to ambulate, fever, loss of control of the bowel or bladder, or any new or worsening concerns.

## 2022-07-12 NOTE — ED NOTES
Patient called with concern for findings on UA.  Concerned that this may represent UTI.  UA shows leukocyte Estrace and bacteria however no nitrite or WBCs.  Discussed that findings could indicate UTI but it is inconclusive as some markers are present and some are not.  Discussed that if she is having symptoms that would be reasonable to treat her for UTI while waiting for the culture to result.  Patient states she will send a message to her primary care physician regarding these results in order to start antibiotics while culture is resulting.     Delmar Cardenas,   07/12/22 [id number]

## 2022-07-13 LAB — BACTERIA UR CULT: NORMAL

## 2022-07-13 RX ORDER — CIPROFLOXACIN 500 MG/1
500 TABLET, FILM COATED ORAL 2 TIMES DAILY
OUTPATIENT
Start: 2022-07-13

## 2022-07-13 NOTE — RESULT ENCOUNTER NOTE
Final urine culture report is negative.  Adult Negative Urine culture parameters per protocol: Any # Urogenital single or mixed organism, <10,000 col/ml single organism (cath/midstream), and > 3 organisms (No susceptibilities performed).  [facility] Emergency Dept discharge antibiotic prescribed (If applicable): None  Treatment recommendations per [facility] ED Lab Result Urine Culture protocol.

## 2022-07-13 NOTE — TELEPHONE ENCOUNTER
Forwarded to covering provider. UA/UC labs are finalized.  Please advise if antibiotic is appropriate. Thank you. Hira Varela RN on 7/13/2022 at 7:55 AM

## 2022-07-13 NOTE — TELEPHONE ENCOUNTER
Routing to Dr. Trevino as he is covering provider for Freda. Would you like patient to come in to be seen for this and get lab work done? Or would you be willing to write a script for iron tablets for her?    Thanks,  Rajni Vega RN on 7/13/2022 at 11:39 AM

## 2022-07-13 NOTE — TELEPHONE ENCOUNTER
Her hemoglobin isn't low, so I would like to pursue iron studies prior to treating with iron.  Please schedule her next week.  Okay to use same day/reserved.  Thanks.

## 2022-07-22 ENCOUNTER — OFFICE VISIT (OUTPATIENT)
Dept: FAMILY MEDICINE | Facility: CLINIC | Age: 42
End: 2022-07-22
Payer: COMMERCIAL

## 2022-07-22 ENCOUNTER — TELEPHONE (OUTPATIENT)
Dept: FAMILY MEDICINE | Facility: CLINIC | Age: 42
End: 2022-07-22

## 2022-07-22 VITALS
TEMPERATURE: 98.7 F | DIASTOLIC BLOOD PRESSURE: 88 MMHG | HEART RATE: 108 BPM | OXYGEN SATURATION: 98 % | BODY MASS INDEX: 69.88 KG/M2 | SYSTOLIC BLOOD PRESSURE: 138 MMHG | WEIGHT: 293 LBS

## 2022-07-22 DIAGNOSIS — I88.0 MESENTERIC ADENITIS: ICD-10-CM

## 2022-07-22 DIAGNOSIS — R42 DIZZINESS: Primary | ICD-10-CM

## 2022-07-22 DIAGNOSIS — Z11.3 SCREEN FOR STD (SEXUALLY TRANSMITTED DISEASE): ICD-10-CM

## 2022-07-22 DIAGNOSIS — E66.01 MORBID OBESITY (H): ICD-10-CM

## 2022-07-22 DIAGNOSIS — E11.9 TYPE 2 DIABETES MELLITUS WITHOUT COMPLICATION, WITHOUT LONG-TERM CURRENT USE OF INSULIN (H): ICD-10-CM

## 2022-07-22 LAB
CREAT UR-MCNC: 214 MG/DL
MICROALBUMIN UR-MCNC: <12 MG/L
MICROALBUMIN/CREAT UR: NORMAL MG/G{CREAT}

## 2022-07-22 PROCEDURE — 99214 OFFICE O/P EST MOD 30 MIN: CPT | Performed by: NURSE PRACTITIONER

## 2022-07-22 PROCEDURE — 82043 UR ALBUMIN QUANTITATIVE: CPT | Performed by: NURSE PRACTITIONER

## 2022-07-22 RX ORDER — CYCLOBENZAPRINE HCL 10 MG
10 TABLET ORAL 3 TIMES DAILY PRN
COMMUNITY

## 2022-07-22 RX ORDER — LOVASTATIN 20 MG
20 TABLET ORAL AT BEDTIME
Qty: 90 TABLET | Refills: 0 | Status: SHIPPED | OUTPATIENT
Start: 2022-07-22 | End: 2022-10-17

## 2022-07-22 RX ORDER — FLUTICASONE PROPIONATE 50 MCG
2 SPRAY, SUSPENSION (ML) NASAL DAILY
Qty: 16 G | Refills: 3 | Status: SHIPPED | OUTPATIENT
Start: 2022-07-22 | End: 2023-06-19

## 2022-07-22 ASSESSMENT — PAIN SCALES - GENERAL: PAINLEVEL: NO PAIN (0)

## 2022-07-22 NOTE — TELEPHONE ENCOUNTER
lmtcb- unfortunately when the pt left her urine sample it was not collected correctly for the tests that Freda ordered. Please see if pt would like to come and leave another sample
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
## 2022-07-29 NOTE — TELEPHONE ENCOUNTER
Pt would like to leave another urine sample. Pt is going to schedule Lab appointment at another clinic closer to her house.     Sent to scheduling line.

## 2022-08-10 ENCOUNTER — LAB (OUTPATIENT)
Dept: LAB | Facility: CLINIC | Age: 42
End: 2022-08-10
Payer: COMMERCIAL

## 2022-08-10 DIAGNOSIS — Z11.3 SCREEN FOR STD (SEXUALLY TRANSMITTED DISEASE): ICD-10-CM

## 2022-08-10 PROCEDURE — 87491 CHLMYD TRACH DNA AMP PROBE: CPT | Performed by: NURSE PRACTITIONER

## 2022-08-10 PROCEDURE — 87591 N.GONORRHOEAE DNA AMP PROB: CPT | Performed by: NURSE PRACTITIONER

## 2022-08-10 PROCEDURE — 36415 COLL VENOUS BLD VENIPUNCTURE: CPT

## 2022-08-10 PROCEDURE — 86780 TREPONEMA PALLIDUM: CPT

## 2022-08-10 PROCEDURE — 87389 HIV-1 AG W/HIV-1&-2 AB AG IA: CPT

## 2022-08-11 LAB
C TRACH DNA SPEC QL NAA+PROBE: NEGATIVE
HIV 1+2 AB+HIV1 P24 AG SERPL QL IA: NONREACTIVE
N GONORRHOEA DNA SPEC QL NAA+PROBE: NEGATIVE
T PALLIDUM AB SER QL: NONREACTIVE

## 2022-08-12 ENCOUNTER — MYC MEDICAL ADVICE (OUTPATIENT)
Dept: FAMILY MEDICINE | Facility: CLINIC | Age: 42
End: 2022-08-12

## 2022-08-24 ENCOUNTER — ALLIED HEALTH/NURSE VISIT (OUTPATIENT)
Dept: EDUCATION SERVICES | Facility: CLINIC | Age: 42
End: 2022-08-24
Attending: NURSE PRACTITIONER
Payer: COMMERCIAL

## 2022-08-24 DIAGNOSIS — E11.9 TYPE 2 DIABETES MELLITUS WITHOUT COMPLICATION, WITHOUT LONG-TERM CURRENT USE OF INSULIN (H): Primary | ICD-10-CM

## 2022-08-24 DIAGNOSIS — E11.9 TYPE 2 DIABETES MELLITUS (H): Primary | ICD-10-CM

## 2022-08-24 DIAGNOSIS — R73.03 PREDIABETES: ICD-10-CM

## 2022-08-24 PROCEDURE — G0108 DIAB MANAGE TRN  PER INDIV: HCPCS | Mod: AE | Performed by: DIETITIAN, REGISTERED

## 2022-08-24 RX ORDER — ORAL SEMAGLUTIDE 3 MG/1
TABLET ORAL
Qty: 90 TABLET | Refills: 1 | Status: SHIPPED | OUTPATIENT
Start: 2022-08-24 | End: 2022-11-22

## 2022-08-24 RX ORDER — BLOOD SUGAR DIAGNOSTIC
STRIP MISCELLANEOUS
Qty: 100 STRIP | Refills: 3 | Status: SHIPPED | OUTPATIENT
Start: 2022-08-24

## 2022-08-24 RX ORDER — LANCETS
EACH MISCELLANEOUS
Qty: 100 EACH | Refills: 4 | Status: SHIPPED | OUTPATIENT
Start: 2022-08-24

## 2022-08-24 NOTE — PROGRESS NOTES
Diabetes Self-Management Education & Support    Presents for: Initial Assessment for new diagnosis    CDE VISIT MODE: In Person    Assessment Type:   ASSESSMENT:  Met with patient regarding type 2 DM diagnosis. Patient is motivated to work on lifestyle changes to work on weight loss and improve healthy. Discussed GLP1 medications and patient interested in oral -Rybelsus. Discussed medication, proper way to take it.    Reviewed diabetes pathophysiology, BG goals, A1C goals and importance of making lifestyle changes. Carbohydrates reviewed and label reading along with the benefits of activity. Discussed use of glucose meter - patient agreeable to start checking BG once daily.  Reviewed medications, what they do, side effects and importance of taking regularly.    Encouraged balanced diet with lower carbohydrates - aiming for 45 grams carbohydrate/meal with adequate protein and vegetables. Carbohydrate 15 gram = 1 serving reviewed.   Small goals discussed    Patient's most recent   Lab Results   Component Value Date    A1C 6.9 06/27/2022     is meeting goal of <7.0    PLAN  1. Recommend start 3 mg/day of Rybelsus for 30 days then increase to 7 mg/weel  2. Aim for drinking less regular soda and moving more    Topics to cover at upcoming visits: Healthy Eating, Being Active, Monitoring, Taking Medication, Problem Solving, Reducing Risks and Healthy Coping    Follow-up: 4-6 weeks    See Care Plan for co-developed, patient-state behavior change goals.  AVS provided for patient today.    Education Materials Provided:  [facility] Healthy Living with Diabetes Book and My Plate Planner      SUBJECTIVE/OBJECTIVE:  Presents for: Initial Assessment for new diagnosis  Accompanied by: Self  Diabetes education in the past 24mo: No  Focus of Visit: Monitoring  Diabetes type: Type 2  Disease course: Worsening  How confident are you filling out medical forms by yourself:: Extremely  Diabetes management related comments/concerns:  "what do I need to do to loose weight  Transportation concerns: No  Difficulty affording diabetes medication?: No  Difficulty affording diabetes testing supplies?: No  Other concerns:: None  Cultural Influences/Ethnic Background:  Not  or       Diabetes Symptoms & Complications:  Fatigue: Yes  Neuropathy: Yes  Polydipsia: Yes  Polyphagia: Yes  Polyuria: Yes  Visual change: No  Slow healing wounds: No  Symptom course: Stable  Weight trend: Increasing  Complications assessed today?: Yes  Autonomic neuropathy: No  CVA: No  Heart disease: No  Nephropathy: No  Peripheral neuropathy: No  Peripheral Vascular Disease: No  Retinopathy: No  Sexual dysfunction: No    Patient Problem List and Family Medical History reviewed for relevant medical history, current medical status, and diabetes risk factors.    Vitals:  There were no vitals taken for this visit.  Estimated body mass index is 69.88 kg/m  as calculated from the following:    Height as of 7/11/22: 1.626 m (5' 4\").    Weight as of 7/22/22: 184.7 kg (407 lb 1.6 oz).   Last 3 BP:   BP Readings from Last 3 Encounters:   07/22/22 138/88   07/11/22 128/78   06/27/22 130/72       History   Smoking Status     Current Every Day Smoker     Packs/day: 0.50     Years: 17.00     Types: Cigarettes   Smokeless Tobacco     Never Used       Labs:  Lab Results   Component Value Date    A1C 6.9 06/27/2022     Lab Results   Component Value Date    GLC 98 07/11/2022    GLC 82 11/07/2015     Lab Results   Component Value Date     03/31/2022     Direct Measure HDL   Date Value Ref Range Status   12/12/2012 53 >39 mg/dL Final   ]  GFR Estimate   Date Value Ref Range Status   07/11/2022 >90 >60 mL/min/1.73m2 Final     Comment:     Effective December 21, 2021 eGFRcr in adults is calculated using the 2021 CKD-EPI creatinine equation which includes age and gender (Karson drake al., NEJM, DOI: 10.1056/FDVJxa1831459)   03/09/2020 >60 >60 mL/min/1.73m2 Final   11/07/2015 90 >60 " mL/min/1.7m2 Final     Comment:     Non  GFR Calc     GFR Estimate If Black   Date Value Ref Range Status   03/09/2020 >60 >60 mL/min/1.73m2 Final   11/07/2015 >90   GFR Calc   >60 mL/min/1.7m2 Final     Lab Results   Component Value Date    CR 0.78 07/11/2022    CR 0.74 11/07/2015     No results found for: MICROALBUMIN    Healthy Eating:  Healthy Eating Assessed Today: Yes  Cultural/Restorationist diet restrictions?: No  Meal planning/habits: None  How many times a week on average do you eat food made away from home (restaurant/take-out)?: 5+  Meals include: Dinner, Afternoon Snack, Evening Snack  Breakfast: typically skip breakfast  Lunch: sometimes skips and waits until Dinner  Dinner: pizza - pepperoni OR fast food - burger and fries OR grilled cheese  Snacks: chips and dip, sweet - ice cream OR cookies  Beverages: Water, Soda (1 or 2 soda - 7up and gingerale)  Has patient met with a dietitian in the past?: No    Being Active:  Being Active Assessed Today: No  Exercise:: Currently not exercising  Barrier to exercise: Time, Physical limitation    Monitoring:  Blood Glucose Meter: Other  Times checking blood sugar at home (number): Never  Blood glucose trend: Other  BG today with sample meter fasting 116 mg/dL    Taking Medications:    Current Treatments: None  Problems taking diabetes medications regularly?: No  Diabetes medication side effects?: No    Problem Solving:  Problem Solving Assessed Today: No    Reducing Risks:  Diabetes Risks: Sedentary Lifestyle  CAD Risks: Diabetes Mellitus, Family history, Obesity, Sedentary lifestyle, Stress, Tobacco exposure  Has dilated eye exam at least once a year?: Yes  Sees dentist every 6 months?: Yes  Feet checked by healthcare provider in the last year?: No    Healthy Coping:  Healthy Coping Assessed Today: Yes  Emotional response to diabetes: Ready to learn  Informal Support system:: Friends, Parent  Stage of change: PREPARATION (Decided to  change - considering how)  Patient Activation Measure Survey Score:  JULIETTE Score (Last Two) 5/10/2022   JULIETTE Raw Score 34   Activation Score 68.9   JULIETTE Level 3         Care Plan and Education Provided:  Care Plan: Diabetes   Updates made by Shantel Briones RD since 8/24/2022 12:00 AM      Problem: HbA1C Not In Goal       Goal: Establish Regular Follow-Ups with PCP       Task: Discuss with PCP the recommended timing for patient's next follow up visit(s)    Responsible User: Shantel Briones RD      Task: Discuss schedule for PCP visits with patient    Responsible User: Shantel Briones RD      Goal: Get HbA1C Level in Goal       Task: Educate patient on diabetes education self-management topics    Responsible User: Shantel Briones RD      Task: Educate patient on benefits of regular glucose monitoring    Responsible User: Shantel Briones RD      Task: Refer patient to appropriate extended care team member, as needed (Medication Therapy Management, Behavioral Health, Physical Therapy, etc.)    Responsible User: Shantel Briones RD      Task: Discuss diabetes treatment plan with patient    Responsible User: Shantel Briones RD      Problem: Diabetes Self-Management Education Needed to Optimize Self-Care Behaviors       Goal: Understand diabetes pathophysiology and disease progression    This Visit's Progress: 100%   Note:    Discussed diabetes disease process with patient and discussed pathophysiology     Task: Provide education on diabetes pathophysiology and disease progression specfic to patient's diabetes type Completed 8/24/2022   Responsible User: Shantel Briones RD      Goal: Healthy Eating - follow a healthy eating pattern for diabetes    This Visit's Progress: 0%   Note:    Limit regular soda to 5 days/week (1 can)     Task: Provide education on portion control and consistency in amount, composition and timing of food intake Completed 8/24/2022   Responsible User: Anali  Shantel WALKER RD      Task: Provide education on managing carbohydrate intake (carbohydrate counting, plate planning method, etc.) Completed 8/24/2022   Responsible User: Shantel Briones RD      Task: Provide education on weight management Completed 8/24/2022   Responsible User: Shantel Briones RD      Task: Provide education on heart healthy eating Completed 8/24/2022   Responsible User: Shantel Briones RD      Task: Provide education on eating out Completed 8/24/2022   Responsible User: Shantel Briones RD      Task: Develop individualized healthy eating plan with patient    Responsible User: Shantel Briones RD      Goal: Being Active - get regular physical activity, working up to at least 150 minutes per week    This Visit's Progress: 0%   Note:    Stand every hour at work 5 days a week     Task: Provide education on relationship of activity to glucose and precautions to take if at risk for low glucose Completed 8/24/2022   Responsible User: Shantel Briones RD      Task: Discuss barriers to physical activity with patient    Responsible User: Shantel Briones RD      Task: Develop physical activity plan with patient    Responsible User: Shantel Briones RD      Task: Explore community resources including walking groups, assistance programs, and home videos    Responsible User: Shantel Briones RD      Goal: Monitoring - monitor glucose and ketones as directed    This Visit's Progress: 0%   Note:    Check BG once daily at varying times     Task: Provide education on blood glucose monitoring (purpose, proper technique, frequency, glucose targets, interpreting results, when to use glucose control solution, sharps disposal)    Responsible User: Shantel Briones RD      Task: Provide education on continuous glucose monitoring (sensor placement, use of deepak or /reader, understanding glucose trends, alerts and alarms, differences between sensor glucose and blood glucose)     Responsible User: Shantel Briones RD      Task: Provide education on ketone monitoring (when to monitor, frequency, etc.)    Responsible User: Shantel Briones RD      Goal: Taking Medication - patient is consistently taking medications as directed    This Visit's Progress: 0%   Note:    Take medication as prescribed daily     Task: Provide education on action of prescribed medication, including when to take and possible side effects Completed 8/24/2022   Responsible User: Shantel Briones RD      Task: Provide education on insulin and injectable diabetes medications, including administration, storage, site selection and rotation for injection sites    Responsible User: Shantel Briones RD      Task: Discuss barriers to medication adherence with patient and provide management technique ideas as appropriate    Responsible User: Shantel Briones RD      Task: Provide education on frequency and refill details of medications    Responsible User: Shantel Briones RD      Goal: Problem Solving - know how to prevent and manage short-term diabetes complications       Task: Provide education on high blood glucose - causes, signs/symptoms, prevention and treatment    Responsible User: Shantel Briones RD      Task: Provide education on low blood glucose - causes, signs/symptoms, prevention, treatment, carrying a carbohydrate source at all times, and medical identification    Responsible User: Shantel Briones RD      Task: Provide education on safe travel with diabetes    Responsible User: Shantel Briones RD      Task: Provide education on how to care for diabetes on sick days    Responsible User: Shantel Briones RD      Task: Provide education on when to call a health care provider    Responsible User: Shantel Briones RD      Goal: Reducing Risks - know how to prevent and treat long-term diabetes complications       Task: Provide education on major complications of diabetes,  prevention, early diagnostic measures and treatment of complications    Responsible User: Shantel Briones RD      Task: Provide education on recommended care for dental, eye and foot health    Responsible User: Shantel Briones RD      Task: Provide education on Hemoglobin A1c - goals and relationship to blood glucose levels    Responsible User: Shantel Briones RD      Task: Provide education on recommendations for heart health - lipid levels and goals, blood pressure and goals, and aspirin therapy, if indicated    Responsible User: Shantel Briones RD      Task: Provide education on tobacco cessation    Responsible User: Shantel Briones RD      Goal: Healthy Coping - use available resources to cope with the challenges of managing diabetes       Task: Discuss recognizing feelings about having diabetes    Responsible User: Shantel Briones RD      Task: Provide education on the benefits of making appropriate lifestyle changes       Task: Provide education on benefits of utilizing support systems    Responsible User: Shantel Briones RD      Task: Discuss methods for coping with stress    Responsible User: Shantel Briones RD      Task: Provide education on when to seek professional counseling    Responsible User: Shantel Briones RD            Time Spent: 60 minutes  Encounter Type: Individual    Any diabetes medication dose changes were made via the CDE Protocol per the patient's referring provider. A copy of this encounter was shared with the provider.

## 2022-08-24 NOTE — CONFIDENTIAL NOTE
Recommend Rybelsus oral GLP1 medication to start for type 2 DM.    Pended order.  Shantel Briones RDN, LD, [facility]   Certified Diabetes Care &   [phone]

## 2022-08-24 NOTE — PATIENT INSTRUCTIONS
Goals for Diabetes Care:    1. Eat 3 balanced meals each day - Monitor carb intake and aim for 45-60 grams per meal  This would be equal to about 4 choices of carbohydrates. Carbohydrate 1 choice = 15 grams    Do not wait longer than 4-5 hours to eat something  Snacks limit to no more than 15-30 grams of carbohydrates or 1-2 choices  Make sure you include protein source with each meal and at bedtime - this has been shown to help with blood glucose elevations    2. Check blood sugars at least 1 time each day at varying times   Blood Glucose Targets:   1. Fasting and before meal target is 80 - 130   2. 2 hours after a meal target is < 180  Always remember to bring meter and log book to all appointments.    3. Activity really helps improve blood sugars. Try to Incorporate 30 minutes activity into each day - does not need to be all at one time & walking counts!    4. Treating low BG. Rule of 15 = BG 50-70 mg/dL = 15 gram carb (4 oz juice, 4 glucose tabs, OR 4 oz pop), then recheck BG in 15 minutes. If still low repeat. (If BG <50 mg/dL = 8 oz pop/juice or 8 glucose tabs).    5. Take diabetes medications as prescribed   -Rylesus 3 mg once daily in the morning with <4 oz of water. Do not eat or drink for 30 minutes    Follow up with your Diabetic Educator as needed to assess BG targets and need for modifications to medications and/or lifestyle.    Call with any questions.  Thank you!  Shantel Briones RDN, LD, [facility]   Certified Diabetes Care &   [facility]  Triage [phone]

## 2022-08-27 ENCOUNTER — APPOINTMENT (OUTPATIENT)
Dept: GENERAL RADIOLOGY | Facility: CLINIC | Age: 42
End: 2022-08-27
Attending: FAMILY MEDICINE
Payer: COMMERCIAL

## 2022-08-27 ENCOUNTER — HOSPITAL ENCOUNTER (EMERGENCY)
Facility: CLINIC | Age: 42
Discharge: HOME | End: 2022-08-27
Attending: FAMILY MEDICINE | Admitting: FAMILY MEDICINE
Payer: COMMERCIAL

## 2022-08-27 VITALS
OXYGEN SATURATION: 95 % | WEIGHT: 293 LBS | DIASTOLIC BLOOD PRESSURE: 86 MMHG | TEMPERATURE: 98.7 F | BODY MASS INDEX: 69.86 KG/M2 | SYSTOLIC BLOOD PRESSURE: 156 MMHG | RESPIRATION RATE: 18 BRPM | HEART RATE: 90 BPM

## 2022-08-27 DIAGNOSIS — R10.13 EPIGASTRIC PAIN: ICD-10-CM

## 2022-08-27 DIAGNOSIS — E61.1 IRON DEFICIENCY: ICD-10-CM

## 2022-08-27 DIAGNOSIS — M54.6 ACUTE MIDLINE THORACIC BACK PAIN: ICD-10-CM

## 2022-08-27 LAB
ALBUMIN SERPL-MCNC: 3.2 G/DL (ref 3.4–5)
ALP SERPL-CCNC: 118 U/L (ref 40–150)
ALT SERPL W P-5'-P-CCNC: 21 U/L (ref 0–50)
ANION GAP SERPL CALCULATED.3IONS-SCNC: 5 MMOL/L (ref 3–14)
AST SERPL W P-5'-P-CCNC: 15 U/L (ref 0–45)
BASOPHILS # BLD AUTO: 0.1 10E3/UL (ref 0–0.2)
BASOPHILS NFR BLD AUTO: 1 %
BILIRUB SERPL-MCNC: 0.3 MG/DL (ref 0.2–1.3)
BUN SERPL-MCNC: 10 MG/DL (ref 7–30)
CALCIUM SERPL-MCNC: 8.6 MG/DL (ref 8.5–10.1)
CHLORIDE BLD-SCNC: 106 MMOL/L (ref 94–109)
CO2 SERPL-SCNC: 27 MMOL/L (ref 20–32)
CREAT SERPL-MCNC: 0.68 MG/DL (ref 0.52–1.04)
EOSINOPHIL # BLD AUTO: 0.3 10E3/UL (ref 0–0.7)
EOSINOPHIL NFR BLD AUTO: 4 %
ERYTHROCYTE [DISTWIDTH] IN BLOOD BY AUTOMATED COUNT: 15.4 % (ref 10–15)
FERRITIN SERPL-MCNC: 14 NG/ML (ref 12–150)
GFR SERPL CREATININE-BSD FRML MDRD: >90 ML/MIN/1.73M2
GLUCOSE BLD-MCNC: 116 MG/DL (ref 70–99)
HCG SERPL QL: NEGATIVE
HCT VFR BLD AUTO: 38.9 % (ref 35–47)
HGB BLD-MCNC: 12 G/DL (ref 11.7–15.7)
HOLD SPECIMEN: NORMAL
IMM GRANULOCYTES # BLD: 0 10E3/UL
IMM GRANULOCYTES NFR BLD: 0 %
IRON SATN MFR SERPL: 12 % (ref 15–46)
IRON SERPL-MCNC: 39 UG/DL (ref 35–180)
LIPASE SERPL-CCNC: 113 U/L (ref 73–393)
LYMPHOCYTES # BLD AUTO: 1.7 10E3/UL (ref 0.8–5.3)
LYMPHOCYTES NFR BLD AUTO: 22 %
MCH RBC QN AUTO: 25 PG (ref 26.5–33)
MCHC RBC AUTO-ENTMCNC: 30.8 G/DL (ref 31.5–36.5)
MCV RBC AUTO: 81 FL (ref 78–100)
MONOCYTES # BLD AUTO: 0.4 10E3/UL (ref 0–1.3)
MONOCYTES NFR BLD AUTO: 5 %
NEUTROPHILS # BLD AUTO: 5 10E3/UL (ref 1.6–8.3)
NEUTROPHILS NFR BLD AUTO: 68 %
NRBC # BLD AUTO: 0 10E3/UL
NRBC BLD AUTO-RTO: 0 /100
PLATELET # BLD AUTO: 291 10E3/UL (ref 150–450)
POTASSIUM BLD-SCNC: 3.9 MMOL/L (ref 3.4–5.3)
PROT SERPL-MCNC: 7.3 G/DL (ref 6.8–8.8)
RBC # BLD AUTO: 4.8 10E6/UL (ref 3.8–5.2)
SODIUM SERPL-SCNC: 138 MMOL/L (ref 133–144)
TIBC SERPL-MCNC: 314 UG/DL (ref 240–430)
TROPONIN I SERPL HS-MCNC: 43 NG/L
TROPONIN I SERPL HS-MCNC: 43 NG/L
WBC # BLD AUTO: 7.4 10E3/UL (ref 4–11)

## 2022-08-27 PROCEDURE — 84484 ASSAY OF TROPONIN QUANT: CPT | Mod: 91 | Performed by: FAMILY MEDICINE

## 2022-08-27 PROCEDURE — 85004 AUTOMATED DIFF WBC COUNT: CPT | Performed by: FAMILY MEDICINE

## 2022-08-27 PROCEDURE — 83550 IRON BINDING TEST: CPT | Performed by: FAMILY MEDICINE

## 2022-08-27 PROCEDURE — 96361 HYDRATE IV INFUSION ADD-ON: CPT

## 2022-08-27 PROCEDURE — 84484 ASSAY OF TROPONIN QUANT: CPT | Performed by: FAMILY MEDICINE

## 2022-08-27 PROCEDURE — 84703 CHORIONIC GONADOTROPIN ASSAY: CPT | Performed by: FAMILY MEDICINE

## 2022-08-27 PROCEDURE — 71046 X-RAY EXAM CHEST 2 VIEWS: CPT

## 2022-08-27 PROCEDURE — 93005 ELECTROCARDIOGRAM TRACING: CPT

## 2022-08-27 PROCEDURE — 82040 ASSAY OF SERUM ALBUMIN: CPT | Performed by: FAMILY MEDICINE

## 2022-08-27 PROCEDURE — 96374 THER/PROPH/DIAG INJ IV PUSH: CPT

## 2022-08-27 PROCEDURE — 258N000003 HC RX IP 258 OP 636: Performed by: FAMILY MEDICINE

## 2022-08-27 PROCEDURE — 99284 EMERGENCY DEPT VISIT MOD MDM: CPT | Mod: 25 | Performed by: FAMILY MEDICINE

## 2022-08-27 PROCEDURE — 83690 ASSAY OF LIPASE: CPT | Performed by: FAMILY MEDICINE

## 2022-08-27 PROCEDURE — 82728 ASSAY OF FERRITIN: CPT | Performed by: FAMILY MEDICINE

## 2022-08-27 PROCEDURE — 99285 EMERGENCY DEPT VISIT HI MDM: CPT | Mod: 25

## 2022-08-27 PROCEDURE — 250N000011 HC RX IP 250 OP 636: Performed by: FAMILY MEDICINE

## 2022-08-27 PROCEDURE — 250N000013 HC RX MED GY IP 250 OP 250 PS 637: Performed by: FAMILY MEDICINE

## 2022-08-27 PROCEDURE — 96375 TX/PRO/DX INJ NEW DRUG ADDON: CPT

## 2022-08-27 PROCEDURE — 93010 ELECTROCARDIOGRAM REPORT: CPT | Mod: 59 | Performed by: FAMILY MEDICINE

## 2022-08-27 PROCEDURE — 36415 COLL VENOUS BLD VENIPUNCTURE: CPT | Performed by: FAMILY MEDICINE

## 2022-08-27 PROCEDURE — 80053 COMPREHEN METABOLIC PANEL: CPT | Performed by: FAMILY MEDICINE

## 2022-08-27 RX ORDER — KETOROLAC TROMETHAMINE 15 MG/ML
15 INJECTION, SOLUTION INTRAMUSCULAR; INTRAVENOUS ONCE
Status: COMPLETED | OUTPATIENT
Start: 2022-08-27 | End: 2022-08-27

## 2022-08-27 RX ORDER — SODIUM CHLORIDE 9 MG/ML
INJECTION, SOLUTION INTRAVENOUS CONTINUOUS
Status: DISCONTINUED | OUTPATIENT
Start: 2022-08-27 | End: 2022-08-28 | Stop reason: HOSPADM

## 2022-08-27 RX ORDER — SUCRALFATE ORAL 1 G/10ML
1 SUSPENSION ORAL ONCE
Status: COMPLETED | OUTPATIENT
Start: 2022-08-27 | End: 2022-08-27

## 2022-08-27 RX ORDER — ONDANSETRON 2 MG/ML
4 INJECTION INTRAMUSCULAR; INTRAVENOUS EVERY 30 MIN PRN
Status: DISCONTINUED | OUTPATIENT
Start: 2022-08-27 | End: 2022-08-27

## 2022-08-27 RX ADMIN — SUCRALFATE 1 G: 1 SUSPENSION ORAL at 20:59

## 2022-08-27 RX ADMIN — SODIUM CHLORIDE 1000 ML: 9 INJECTION, SOLUTION INTRAVENOUS at 20:51

## 2022-08-27 RX ADMIN — METOCLOPRAMIDE HYDROCHLORIDE 10 MG: 5 INJECTION INTRAMUSCULAR; INTRAVENOUS at 21:49

## 2022-08-27 RX ADMIN — KETOROLAC TROMETHAMINE 15 MG: 15 INJECTION, SOLUTION INTRAMUSCULAR; INTRAVENOUS at 21:42

## 2022-08-27 ASSESSMENT — ENCOUNTER SYMPTOMS
ABDOMINAL PAIN: 1
NAUSEA: 1
FREQUENCY: 0
VOMITING: 0
WHEEZING: 0
HEADACHES: 0
SINUS PRESSURE: 0
SORE THROAT: 0
DYSURIA: 0
FEVER: 0
PALPITATIONS: 0
SHORTNESS OF BREATH: 0
CONSTIPATION: 1
DIARRHEA: 0
DIAPHORESIS: 0
CHILLS: 0
BLOOD IN STOOL: 0
COUGH: 0

## 2022-08-27 ASSESSMENT — ACTIVITIES OF DAILY LIVING (ADL)
ADLS_ACUITY_SCORE: 35
ADLS_ACUITY_SCORE: 35
ADLS_ACUITY_SCORE: 33

## 2022-08-27 NOTE — Clinical Note
Natalie Fritsch was seen and treated in our emergency department on 8/27/2022.  She may return to work on 08/29/2022.       If you have any questions or concerns, please don't hesitate to call.      Richard Parra MD

## 2022-08-27 NOTE — ED TRIAGE NOTES
Headache, upper abdominal pain that radiates to back, and nausea that started this morning.       Triage Assessment     Row Name 08/27/22 6650       Triage Assessment (Adult)    Airway WDL WDL       Respiratory WDL    Respiratory WDL WDL       Skin Circulation/Temperature WDL    Skin Circulation/Temperature WDL WDL       Cardiac WDL    Cardiac WDL WDL       Peripheral/Neurovascular WDL    Peripheral Neurovascular WDL WDL       Cognitive/Neuro/Behavioral WDL    Cognitive/Neuro/Behavioral WDL WDL

## 2022-08-28 NOTE — DISCHARGE INSTRUCTIONS
ICD-10-CM    1. Iron deficiency  E61.1     suspect this present based on the cbc dspite a normal hgb.  await iron testing and if confirmed take ferrous sulfate 324 mg orally daily for 6 weeks and then recheck,   2. Epigastric pain  R10.13     suspect gastritis, vs GERD with symptoms radiating.  would recommend prilosec 20 mg orally daily for 30 days.  no food 2 hours before bed, maalox or [place] nd pecid 40 mg orally daily could be used for the fiurst week until prilosec takes effect.  no serious findings on labs.   3. Acute midline thoracic back pain  M54.6     reproducible on exam.  maintain back range of motion. follow-up in clinic   consider tere online for thoracic back pain

## 2022-08-28 NOTE — ED PROVIDER NOTES
History     Chief Complaint   Patient presents with     Abdominal Pain     HPI  Natalie J Fritsch is a 42 year old female who presents with Nexplanon in place, type 2 diabetes mellitus, major depression status postcholecystectomy, PTSD, anxiety.  She works in alcohol and drug rehab and she was working her shift at around 5:30 AM when she began to experience an anterior epigastric pain radiating to her back intrascapular region.  She had a sense of nausea.  She has had chronic constipation.  The symptoms seem to improve after she had gone home.  She went to bed and then awoke 5 hours later, she felt better for about an hour and then once again symptoms recurred.  This was not triggered by food.  Not exertional.  There is been no blood in the stool or black tarry stools.      Patient uses tobacco and vapes but does not use marijuana alcohol or other drugs.  She has a distant history more than 5 years ago of chemical dependency and she has been through treatment.  She tells me this openly.      Allergies:  Allergies   Allergen Reactions     Cat Hair Extract      Penicillin V        Problem List:    Patient Active Problem List    Diagnosis Date Noted     Diabetes mellitus, type 2 (H) 07/22/2022     Priority: Medium     Morbid obesity (H) 03/31/2022     Priority: Medium     Headache      Priority: Medium     Created by Conversion         Recurrent major depressive disorder, in partial remission (H) 01/19/2018     Priority: Medium     Acute reaction to stress      Priority: Medium     Created by Conversion         Increased BMI      Priority: Medium     Created by Conversion         S/P cholecystectomy      Priority: Medium     History of posttraumatic stress disorder (PTSD) 10/20/2016     Priority: Medium     Vitamin D Deficiency      Priority: Medium     Created by Conversion  Replacement Utility updated for latest IMO load         Anxiety      Priority: Medium     Created by Conversion  Replacement Utility updated  for latest IMO load         Thoracic back pain 06/26/2016     Priority: Medium     Uses birth control 06/20/2016     Priority: Medium     IMO Update            Past Medical History:    Past Medical History:   Diagnosis Date     Abnormal Pap smear of cervix      Acute cholecystitis 8/27/2017     Anal fissure      Bipolar disorder (H)      Calculus of kidney      Cholecystitis 8/27/2017     Depressive disorder 2003     Diabetes (H) 4 months ago     H/O borderline personality disorder 10/21/2016     Obese        Past Surgical History:    Past Surgical History:   Procedure Laterality Date     LAPAROSCOPIC CHOLECYSTECTOMY N/A 08/28/2017    Procedure: CHOLECYSTECTOMY, LAPAROSCOPIC;  Surgeon: Vinay Jolly MD;  Location: [facility] [place];  Service:      LITHOTRIPSY         Family History:    Family History   Problem Relation Age of Onset     Substance Abuse Mother         Alcohol     Mental Illness Mother         Narcissistic PD and Histrionic PD     Hypertension Mother      Substance Abuse Father         Polysubstance mainly ETOH     Mental Illness Father         PTSD ASPD     Other Cancer Father         Mouth cancer HPV strains for alcohol and tobacco     Depression Father      Anesthesia Reaction Father      Obesity Father      Anxiety Disorder Maternal Grandmother      Hypertension Maternal Grandmother      Substance Abuse Paternal Grandfather         ETOH     Hypertension Maternal Grandfather      Cerebrovascular Disease Maternal Grandfather      Depression Other        Social History:  Marital Status:  Single [1]  Social History     Tobacco Use     Smoking status: Current Every Day Smoker     Packs/day: 0.50     Years: 17.00     Pack years: 8.50     Types: Cigarettes     Smokeless tobacco: Never Used   Vaping Use     Vaping Use: Every day     Substances: Nicotine     Devices: Pre-filled pod   Substance Use Topics     Alcohol use: Not Currently     Comment: Alcoholic Drinks/day: social      Drug use: No      Comment: history of meth and cocaine abuse; sober for 3 years ago        Medications:    blood glucose (ACCU-CHEK GUIDE) test strip  blood glucose monitoring (SOFTCLIX) lancets  cyclobenzaprine (FLEXERIL) 10 MG tablet  etonogestrel (NEXPLANON) 68 MG IMPL  fluticasone (FLONASE) 50 MCG/ACT nasal spray  guanFACINE (INTUNIV) 2 MG TB24 24 hr tablet  lovastatin (MEVACOR) 20 MG tablet  meclizine (ANTIVERT) 25 MG tablet  Semaglutide (RYBELSUS) 3 MG TABS  venlafaxine (EFFEXOR-ER) 225 MG 24 hr tablet          Review of Systems   Constitutional: Negative for chills, diaphoresis and fever.   HENT: Negative for ear pain, sinus pressure and sore throat.    Eyes: Negative for visual disturbance.   Respiratory: Negative for cough, shortness of breath and wheezing.    Cardiovascular: Negative for chest pain and palpitations.   Gastrointestinal: Positive for abdominal pain, constipation and nausea. Negative for blood in stool, diarrhea and vomiting.   Genitourinary: Negative for dysuria, frequency and urgency.   Skin: Negative for rash.   Neurological: Negative for headaches.   All other systems reviewed and are negative.      Physical Exam   BP: (!) 160/101  Pulse: 92  Temp: 98.7  F (37.1  C)  Resp: 18  Weight: (!) 184.6 kg (407 lb)  SpO2: 100 %      Physical Exam  Constitutional:       General: She is in acute distress.      Appearance: She is not diaphoretic.   Eyes:      Conjunctiva/sclera: Conjunctivae normal.   Cardiovascular:      Rate and Rhythm: Normal rate and regular rhythm.      Heart sounds: No murmur heard.  Pulmonary:      Effort: Pulmonary effort is normal. No respiratory distress.      Breath sounds: Normal breath sounds. No stridor. No wheezing or rhonchi.   Abdominal:      General: Abdomen is flat. Bowel sounds are normal. There is no distension.      Palpations: Abdomen is soft. There is no mass.      Tenderness: There is abdominal tenderness in the epigastric area. There is no right CVA tenderness, left CVA  tenderness or guarding.   Musculoskeletal:      Cervical back: Neck supple.      Right lower leg: No edema.      Left lower leg: No edema.   Skin:     Coloration: Skin is not pale.      Findings: No rash.   Neurological:      General: No focal deficit present.      Mental Status: She is alert.      Motor: No weakness.       Bilateral radial pulses are symmetric.    ED Course                 EKG Interpretation:      Interpreted by Richard Parra MD  EKG done at 1936 hrs. demonstrates a sinus rhythm 76 beats per minute with a normal axis.  There is no ST change.  No T wave changes.  Normal R progression no Q waves.  Normal intervals.  Normal conduction with exception of RSR prime lead V1.  No ectopy.  Normal conduction otherwise.  Impression sinus rhythm 76 bpm no acute       Procedures              Critical Care time:  none               Results for orders placed or performed during the hospital encounter of 08/27/22 (from the past 24 hour(s))   [place] Draw    Narrative    The following orders were created for panel order [place] Draw.  Procedure                               Abnormality         Status                     ---------                               -----------         ------                     Extra Blue Top Tube[[id number]]                              Final result               Extra Red Top Tube[[id number]]                               Final result               Extra Green Top (Lithium...[[id number]]                      Final result               Extra Purple Top Tube[[id number]]                            Final result                 Please view results for these tests on the individual orders.   CBC with platelets differential    Narrative    The following orders were created for panel order CBC with platelets differential.  Procedure                               Abnormality         Status                     ---------                               -----------         ------                      CBC with platelets and d...[[id number]]  Abnormal            Final result                 Please view results for these tests on the individual orders.   Comprehensive metabolic panel   Result Value Ref Range    Sodium 138 133 - 144 mmol/L    Potassium 3.9 3.4 - 5.3 mmol/L    Chloride 106 94 - 109 mmol/L    Carbon Dioxide (CO2) 27 20 - 32 mmol/L    Anion Gap 5 3 - 14 mmol/L    Urea Nitrogen 10 7 - 30 mg/dL    Creatinine 0.68 0.52 - 1.04 mg/dL    Calcium 8.6 8.5 - 10.1 mg/dL    Glucose 116 (H) 70 - 99 mg/dL    Alkaline Phosphatase 118 40 - 150 U/L    AST 15 0 - 45 U/L    ALT 21 0 - 50 U/L    Protein Total 7.3 6.8 - 8.8 g/dL    Albumin 3.2 (L) 3.4 - 5.0 g/dL    Bilirubin Total 0.3 0.2 - 1.3 mg/dL    GFR Estimate >90 >60 mL/min/1.73m2   Troponin I   Result Value Ref Range    Troponin I High Sensitivity 43 <54 ng/L   Lipase   Result Value Ref Range    Lipase 113 73 - 393 U/L   HCG qualitative pregnancy (blood)   Result Value Ref Range    hCG Serum Qualitative Negative Negative   Extra Blue Top Tube   Result Value Ref Range    Hold Specimen     Extra Red Top Tube   Result Value Ref Range    Hold Specimen     Extra Green Top (Lithium Heparin) Tube   Result Value Ref Range    Hold Specimen     Extra Purple Top Tube   Result Value Ref Range    Hold Specimen     CBC with platelets and differential   Result Value Ref Range    WBC Count 7.4 4.0 - 11.0 10e3/uL    RBC Count 4.80 3.80 - 5.20 10e6/uL    Hemoglobin 12.0 11.7 - 15.7 g/dL    Hematocrit 38.9 35.0 - 47.0 %    MCV 81 78 - 100 fL    MCH 25.0 (L) 26.5 - 33.0 pg    MCHC 30.8 (L) 31.5 - 36.5 g/dL    RDW 15.4 (H) 10.0 - 15.0 %    Platelet Count 291 150 - 450 10e3/uL    % Neutrophils 68 %    % Lymphocytes 22 %    % Monocytes 5 %    % Eosinophils 4 %    % Basophils 1 %    % Immature Granulocytes 0 %    NRBCs per 100 WBC 0 <1 /100    Absolute Neutrophils 5.0 1.6 - 8.3 10e3/uL    Absolute Lymphocytes 1.7 0.8 - 5.3 10e3/uL    Absolute Monocytes 0.4 0.0 - 1.3 10e3/uL    Absolute  Eosinophils 0.3 0.0 - 0.7 10e3/uL    Absolute Basophils 0.1 0.0 - 0.2 10e3/uL    Absolute Immature Granulocytes 0.0 <=0.4 10e3/uL    Absolute NRBCs 0.0 10e3/uL   Chest XR,  PA & LAT    Narrative    EXAM: XR CHEST 2 VIEWS  LOCATION: [facility]  DATE/TIME: 8/27/2022 8:38 PM    INDICATION: chest pain  COMPARISON: None.      Impression    IMPRESSION: Heart size is normal. Lungs are clear bilaterally. Mediastinum and visualized bony structures are unremarkable.   Troponin I   Result Value Ref Range    Troponin I High Sensitivity 43 <54 ng/L   Ferritin   Result Value Ref Range    Ferritin 14 12 - 150 ng/mL   Iron and iron binding capacity   Result Value Ref Range    Iron 39 35 - 180 ug/dL    Iron Binding Capacity 314 240 - 430 ug/dL    Iron Sat Index 12 (L) 15 - 46 %       Medications   0.9% sodium chloride BOLUS (has no administration in time range)     Followed by   sodium chloride 0.9% infusion (has no administration in time range)   ondansetron (ZOFRAN) injection 4 mg (has no administration in time range)   sucralfate (CARAFATE) suspension 1 g (has no administration in time range)   metoclopramide (REGLAN) 10 mg in sodium chloride 0.9 % 50 mL infusion (has no administration in time range)       Assessments & Plan (with Medical Decision Making)     MDM: Natalie J Fritsch is a 42 year old female who presents with abdominal pain primarily epigastric radiating to the back with normal examination only mild tenderness in the epigastrium no blood in the stool black tarry stools.  History of constipation.  Status postcholecystectomy no known peptic ulcer disease.  No emesis.  Spontaneous onset today and is not exertional not worse with food.  Possible causes include gastritis duodenitis, gastroesophageal reflux, coronary event, hiatal hernia, delayed gastric emptying or gastroparesis, constipation.  No signs of dissection.  Pancreatitis certainly possible.  No known risks.  Did just start statin  drug in the last month.    Plan for IV fluid, empiric Reglan, EKG, chest x-ray, CBC comprehensive panel lipase.  No indication at this point for abdominal imaging.    Patient felt improved after above management.  Serial troponins x2 was reassuring.  Both were less than discriminatory values.  EKG nonischemic.  I do suspect her symptoms are primarily GI.  Made recommendations as below.  Precautions given for return.    I have reviewed the nursing notes.    I have reviewed the findings, diagnosis, plan and need for follow up with the patient.       New Prescriptions    No medications on file       Final diagnoses:   Iron deficiency - suspect this present based on the cbc dspite a normal hgb.  await iron testing and if confirmed take ferrous sulfate 324 mg orally daily for 6 weeks and then recheck,   Epigastric pain - suspect gastritis, vs GERD with symptoms radiating.  would recommend prilosec 20 mg orally daily for 30 days.  no food 2 hours before bed, maalox or mylanta nd pecid 40 mg orally daily could be used for the fiurst week until prilosec takes effect.  no serious findings on labs.   Acute midline thoracic back pain - reproducible on exam.  maintain back range of motion. follow-up in clinic       8/27/2022   [facility] EMERGENCY DEPT     Richard Parra MD  08/27/22 [id number]

## 2022-08-29 ENCOUNTER — TELEPHONE (OUTPATIENT)
Dept: EDUCATION SERVICES | Facility: CLINIC | Age: 42
End: 2022-08-29

## 2022-08-29 DIAGNOSIS — E11.9 TYPE 2 DIABETES MELLITUS WITHOUT COMPLICATION, WITHOUT LONG-TERM CURRENT USE OF INSULIN (H): Primary | ICD-10-CM

## 2022-08-29 NOTE — TELEPHONE ENCOUNTER
Patient called. She was told the Accu-check guide me test strips is going to cost her 50.00 oop and said that iit's not covered by her insurance. She will need a prescription for a new meter that would be covered by her insurance along with testing supplies.     [facility] in [place]

## 2022-08-31 NOTE — TELEPHONE ENCOUNTER
Sent RX for generic glucometer and testing supplies to the pharmacy requested.    Shantel Briones RDN, LD, [facility]   Certified Diabetes Care &   [phone]

## 2022-09-01 ENCOUNTER — MYC MEDICAL ADVICE (OUTPATIENT)
Dept: FAMILY MEDICINE | Facility: CLINIC | Age: 42
End: 2022-09-01

## 2022-09-01 DIAGNOSIS — D64.9 ANEMIA, UNSPECIFIED TYPE: Primary | ICD-10-CM

## 2022-09-02 RX ORDER — FERROUS SULFATE 325(65) MG
325 TABLET ORAL
Qty: 30 TABLET | Refills: 1 | Status: SHIPPED | OUTPATIENT
Start: 2022-09-02

## 2022-09-26 ENCOUNTER — VIRTUAL VISIT (OUTPATIENT)
Dept: EDUCATION SERVICES | Facility: CLINIC | Age: 42
End: 2022-09-26
Payer: COMMERCIAL

## 2022-09-26 DIAGNOSIS — E11.9 DIABETES MELLITUS, TYPE 2 (H): Primary | ICD-10-CM

## 2022-09-26 DIAGNOSIS — E11.9 TYPE 2 DIABETES MELLITUS WITHOUT COMPLICATION, WITHOUT LONG-TERM CURRENT USE OF INSULIN (H): Primary | ICD-10-CM

## 2022-09-26 PROCEDURE — 97802 MEDICAL NUTRITION INDIV IN: CPT | Mod: 95 | Performed by: DIETITIAN, REGISTERED

## 2022-09-26 NOTE — PROGRESS NOTES
Diabetes Self-Management Education & Support    Presents for: Follow-up    Type of service:  Video Visit    If the video visit is dropped, the video visit invitation should be resent by: Text to cell phone: [phone]    Originating Location (pt. Location): Home  Distant Location (provider location): Home  Mode of Communication:  Video Conference via [employer]    Video Start Time: 2:05 PM  Video End Time (time video stopped): 2:48 PM    How would patient like to obtain AVS? MyChart      Assessment Type:   ASSESSMENT:  Patient is doing well with lifestyle modifications. Since last visit Cathi has started Rybelsus with no issues. Had some issues getting her glucometer, but has in hand today. Walked patient through the steps of checking BG and today fasting BG was 113 mg/dL.  Cathi has reduced soda from 2 cans/day to 1-2 cans/week. She has started drinking sugar free soda (Zevia). Also Cathi is making an effort to get more active daily - she is parking further away, walking between stores and doing more household choirs.  Reviewed food choices in length and Cathi is cooking more at home and not going out to eat. She was eating out at least 5 days a week and now she is preparing meals. Reviewed portion control, meal planning and healthy options. Encouraged small changes to food choices. Reviewed myplate.gov and diabetes.org food hub for good references for recipes.    Patient's most recent   Lab Results   Component Value Date    A1C 6.9 06/27/2022     is meeting goal of <7.0    Diabetes knowledge and skills assessment:   Patient is knowledgeable in diabetes management concepts related to: Taking Medication    Continue education with the following diabetes management concepts: Healthy Eating, Being Active, Monitoring and Taking Medication    Based on learning assessment above, most appropriate setting for further diabetes education would be: Individual setting.      PLAN  1. Continue to work on lifestyle  "modifications  2. Recommend increase Rybelsus to 7 mg once done with 4 weeks of 3 mg dose.  3. Schedule with PCP and A1C follor up    Topics to cover at upcoming visits: Healthy Eating, Being Active, Monitoring and Taking Medication    Follow-up: 4-8 weeks    See Care Plan for co-developed, patient-state behavior change goals.  AVS provided for patient today.    Education Materials Provided:  No new materials provided today      SUBJECTIVE/OBJECTIVE:  Presents for: Follow-up  Accompanied by: Self  Diabetes education in the past 24mo: No  Focus of Visit: Monitoring  Diabetes type: Type 2  Date of diagnosis: June 2022  Disease course: Worsening  How confident are you filling out medical forms by yourself:: Extremely  Diabetes management related comments/concerns: eating habits  Transportation concerns: No  Difficulty affording diabetes medication?: No  Difficulty affording diabetes testing supplies?: No  Other concerns:: None  Cultural Influences/Ethnic Background:  Not  or     Diabetes Symptoms & Complications:     Complications assessed today?: No    Patient Problem List and Family Medical History reviewed for relevant medical history, current medical status, and diabetes risk factors.    Vitals:  There were no vitals taken for this visit.  Estimated body mass index is 69.86 kg/m  as calculated from the following:    Height as of 7/11/22: 1.626 m (5' 4\").    Weight as of 8/27/22: 184.6 kg (407 lb).   Last 3 BP:   BP Readings from Last 3 Encounters:   08/27/22 (!) 156/86   07/22/22 138/88   07/11/22 128/78       History   Smoking Status     Current Every Day Smoker     Packs/day: 0.50     Years: 17.00     Types: Cigarettes   Smokeless Tobacco     Never Used       Labs:  Lab Results   Component Value Date    A1C 6.9 06/27/2022     Lab Results   Component Value Date     08/27/2022    GLC 82 11/07/2015     Lab Results   Component Value Date     03/31/2022     Direct Measure HDL   Date Value " Ref Range Status   12/12/2012 53 >39 mg/dL Final   ]  GFR Estimate   Date Value Ref Range Status   08/27/2022 >90 >60 mL/min/1.73m2 Final     Comment:     Effective December 21, 2021 eGFRcr in adults is calculated using the 2021 CKD-EPI creatinine equation which includes age and gender (Karson drake al., [place], DOI: 10.1056/MYHSly8419097)   03/09/2020 >60 >60 mL/min/1.73m2 Final   11/07/2015 90 >60 mL/min/1.7m2 Final     Comment:     Non  GFR Calc     GFR Estimate If Black   Date Value Ref Range Status   03/09/2020 >60 >60 mL/min/1.73m2 Final   11/07/2015 >90   GFR Calc   >60 mL/min/1.7m2 Final     Lab Results   Component Value Date    CR 0.68 08/27/2022    CR 0.74 11/07/2015     No results found for: MICROALBUMIN    Healthy Eating:  Healthy Eating Assessed Today: Yes  Cultural/Hindu diet restrictions?: No  Meal planning/habits: None  How many times a week on average do you eat food made away from home (restaurant/take-out)?: 1  Meals include: Dinner, Afternoon Snack, Evening Snack  Breakfast: trying to have yogurt or muscle milk or Ensure pro supplement to replace a meal - just got these. Typically skips breakfast. Magic Spoon cereal and breakfast bars.  Lunch: eggs, salad, cottage cheese  Dinner: Trying to eat out less. Making pasta meals, chicken and peas. OR mini tacos OR sandwich on oat nut bread OR  Snacks: chips and dip, sweet - ice cream OR cookies  Beverages: Water, Diet soda (1-2 regular soda a week)  Has patient met with a dietitian in the past?: Yes    Being Active:  Being Active Assessed Today: No  Exercise:: Currently not exercising  Barrier to exercise: Time, Physical limitation    Monitoring:  Monitoring Assessed Today: Yes  Did patient bring glucose meter to appointment? : Yes  Blood Glucose Meter: One Touch  Times checking blood sugar at home (number): Never  Blood glucose trend: Other      Taking Medications:  Diabetes Medication(s)     Incretin Mimetic Agents (GLP-1  Receptor Agonists)       Semaglutide (RYBELSUS) 3 MG TABS    Take 3 mg by mouth daily for 30 days, THEN 7 mg daily for 60 days.          Taking Medication Assessed Today: Yes  Current Treatments: None  Problems taking diabetes medications regularly?: No  Diabetes medication side effects?: No    Problem Solving:  Problem Solving Assessed Today: No  Is the patient at risk for hypoglycemia?: No  Is the patient at risk for DKA?: No              Reducing Risks:  Reducing Risks Assessed Today: Yes  Diabetes Risks: Sedentary Lifestyle  CAD Risks: Diabetes Mellitus, Family history, Obesity, Sedentary lifestyle, Stress, Tobacco exposure  Has dilated eye exam at least once a year?: Yes  Sees dentist every 6 months?: Yes  Feet checked by healthcare provider in the last year?: No    Healthy Coping:  Healthy Coping Assessed Today: Yes  Emotional response to diabetes: Ready to learn  Informal Support system:: Friends, Parent  Stage of change: PREPARATION (Decided to change - considering how)  Patient Activation Measure Survey Score:  JULIETTE Score (Last Two) 5/10/2022   JULIETTE Raw Score 34   Activation Score 68.9   JULIETTE Level 3         Care Plan and Education Provided:  Care Plan: Diabetes   Updates made by Shantel Briones RD since 9/26/2022 12:00 AM      Problem: Diabetes Self-Management Education Needed to Optimize Self-Care Behaviors       Goal: Healthy Eating - follow a healthy eating pattern for diabetes    This Visit's Progress: 100%   Recent Progress: 0%   Note:    Limit regular soda to 5 days/week (1 can)     Goal: Being Active - get regular physical activity, working up to at least 150 minutes per week    This Visit's Progress: 50%   Recent Progress: 0%   Note:    Stand every hour at work 5 days a week and park further away at stores     Goal: Monitoring - monitor glucose and ketones as directed    This Visit's Progress: 0%   Recent Progress: 0%   Note:    Check BG once daily at varying times     Goal: Taking Medication -  patient is consistently taking medications as directed    This Visit's Progress: 100%   Recent Progress: 0%   Note:    Take medication as prescribed daily     Goal: Reducing Risks - know how to prevent and treat long-term diabetes complications       Task: Provide education on Hemoglobin A1c - goals and relationship to blood glucose levels Completed 9/26/2022   Responsible User: Shantel Briones RD      Goal: Healthy Coping - use available resources to cope with the challenges of managing diabetes       Task: Discuss recognizing feelings about having diabetes Completed 9/26/2022   Responsible User: Shantel Briones RD      Task: Provide education on the benefits of making appropriate lifestyle changes Completed 9/26/2022            Time Spent: 43 minutes  Encounter Type: Individual    Any diabetes medication dose changes were made via the CDE Protocol per the patient's referring provider. A copy of this encounter was shared with the provider.

## 2022-09-26 NOTE — PATIENT INSTRUCTIONS
Goals for Diabetes Care:    1. Eat 3 balanced meals each day - Monitor carb intake and aim for 45-60 grams per meal  This would be equal to about 4 choices of carbohydrates. Carbohydrate 1 choice = 15 grams    Do not wait longer than 4-5 hours to eat something  Snacks limit to no more than 15-30 grams of carbohydrates or 1-2 choices  Make sure you include protein source with each meal and at bedtime - this has been shown to help with blood glucose elevations    2. Check blood sugars at least 1 time each day at varying times   Blood Glucose Targets:   1. Fasting and before meal target is 80 - 130   2. 2 hours after a meal target is < 180  Always remember to bring meter and log book to all appointments.    3. Activity really helps improve blood sugars. Try to Incorporate 30 minutes activity into each day - does not need to be all at one time & walking counts!    4. Treating low BG. Rule of 15 = BG 50-70 mg/dL = 15 gram carb (4 oz juice, 4 glucose tabs, OR 4 oz pop), then recheck BG in 15 minutes. If still low repeat. (If BG <50 mg/dL = 8 oz pop/juice or 8 glucose tabs).    5. Take diabetes medications as prescribed   -Once you complete 4 weeks of 3 mg of rybelsus increase to 7 mg/week.    Follow up with your Diabetic Educator as needed to assess BG targets and need for modifications to medications and/or lifestyle.    Call with any questions.  Thank you!  Shantel Briones RDN, LD, [facility]   Certified Diabetes Care &   [facility]  Triage [phone]

## 2022-09-26 NOTE — LETTER
9/26/2022         RE: Natalie J Fritsch  [id number] [facility] [id number]        Dear Colleague,    Thank you for referring your patient, Natalie J Fritsch, to the [facility]. Please see a copy of my visit note below.    Diabetes Self-Management Education & Support    Presents for: Follow-up    Type of service:  Video Visit    If the video visit is dropped, the video visit invitation should be resent by: Text to cell phone: [phone]    Originating Location (pt. Location): Home  Distant Location (provider location): Home  Mode of Communication:  Video Conference via [employer]    Video Start Time: 2:05 PM  Video End Time (time video stopped): 2:48 PM    How would patient like to obtain AVS? MyChart      Assessment Type:   ASSESSMENT:  Patient is doing well with lifestyle modifications. Since last visit Cathi has started Rybelsus with no issues. Had some issues getting her glucometer, but has in hand today. Walked patient through the steps of checking BG and today fasting BG was 113 mg/dL.  Cathi has reduced soda from 2 cans/day to 1-2 cans/week. She has started drinking sugar free soda (Zevia). Also Cathi is making an effort to get more active daily - she is parking further away, walking between stores and doing more household choirs.  Reviewed food choices in length and Cathi is cooking more at home and not going out to eat. She was eating out at least 5 days a week and now she is preparing meals. Reviewed portion control, meal planning and healthy options. Encouraged small changes to food choices. Reviewed myplate.gov and diabetes.org food hub for good references for recipes.    Patient's most recent   Lab Results   Component Value Date    A1C 6.9 06/27/2022     is meeting goal of <7.0    Diabetes knowledge and skills assessment:   Patient is knowledgeable in diabetes management concepts related to: Taking Medication    Continue education with the following  "diabetes management concepts: Healthy Eating, Being Active, Monitoring and Taking Medication    Based on learning assessment above, most appropriate setting for further diabetes education would be: Individual setting.      PLAN  1. Continue to work on lifestyle modifications  2. Recommend increase Rybelsus to 7 mg once done with 4 weeks of 3 mg dose.  3. Schedule with PCP and A1C follor up    Topics to cover at upcoming visits: Healthy Eating, Being Active, Monitoring and Taking Medication    Follow-up: 4-8 weeks    See Care Plan for co-developed, patient-state behavior change goals.  AVS provided for patient today.    Education Materials Provided:  No new materials provided today      SUBJECTIVE/OBJECTIVE:  Presents for: Follow-up  Accompanied by: Self  Diabetes education in the past 24mo: No  Focus of Visit: Monitoring  Diabetes type: Type 2  Date of diagnosis: June 2022  Disease course: Worsening  How confident are you filling out medical forms by yourself:: Extremely  Diabetes management related comments/concerns: eating habits  Transportation concerns: No  Difficulty affording diabetes medication?: No  Difficulty affording diabetes testing supplies?: No  Other concerns:: None  Cultural Influences/Ethnic Background:  Not  or     Diabetes Symptoms & Complications:     Complications assessed today?: No    Patient Problem List and Family Medical History reviewed for relevant medical history, current medical status, and diabetes risk factors.    Vitals:  There were no vitals taken for this visit.  Estimated body mass index is 69.86 kg/m  as calculated from the following:    Height as of 7/11/22: 1.626 m (5' 4\").    Weight as of 8/27/22: 184.6 kg (407 lb).   Last 3 BP:   BP Readings from Last 3 Encounters:   08/27/22 (!) 156/86   07/22/22 138/88   07/11/22 128/78       History   Smoking Status     Current Every Day Smoker     Packs/day: 0.50     Years: 17.00     Types: Cigarettes   Smokeless Tobacco     " Never Used       Labs:  Lab Results   Component Value Date    A1C 6.9 06/27/2022     Lab Results   Component Value Date     08/27/2022    GLC 82 11/07/2015     Lab Results   Component Value Date     03/31/2022     Direct Measure HDL   Date Value Ref Range Status   12/12/2012 53 >39 mg/dL Final   ]  GFR Estimate   Date Value Ref Range Status   08/27/2022 >90 >60 mL/min/1.73m2 Final     Comment:     Effective December 21, 2021 eGFRcr in adults is calculated using the 2021 CKD-EPI creatinine equation which includes age and gender (Karson et al., NEJM, DOI: 10.1056/ZNRPum5163264)   03/09/2020 >60 >60 mL/min/1.73m2 Final   11/07/2015 90 >60 mL/min/1.7m2 Final     Comment:     Non  GFR Calc     GFR Estimate If Black   Date Value Ref Range Status   03/09/2020 >60 >60 mL/min/1.73m2 Final   11/07/2015 >90   GFR Calc   >60 mL/min/1.7m2 Final     Lab Results   Component Value Date    CR 0.68 08/27/2022    CR 0.74 11/07/2015     No results found for: MICROALBUMIN    Healthy Eating:  Healthy Eating Assessed Today: Yes  Cultural/Buddhist diet restrictions?: No  Meal planning/habits: None  How many times a week on average do you eat food made away from home (restaurant/take-out)?: 1  Meals include: Dinner, Afternoon Snack, Evening Snack  Breakfast: trying to have yogurt or muscle milk or Ensure pro supplement to replace a meal - just got these. Typically skips breakfast. Magic Spoon cereal and breakfast bars.  Lunch: eggs, salad, cottage cheese  Dinner: Trying to eat out less. Making pasta meals, chicken and peas. OR mini tacos OR sandwich on oat nut bread OR  Snacks: chips and dip, sweet - ice cream OR cookies  Beverages: Water, Diet soda (1-2 regular soda a week)  Has patient met with a dietitian in the past?: Yes    Being Active:  Being Active Assessed Today: No  Exercise:: Currently not exercising  Barrier to exercise: Time, Physical limitation    Monitoring:  Monitoring Assessed  Today: Yes  Did patient bring glucose meter to appointment? : Yes  Blood Glucose Meter: One Touch  Times checking blood sugar at home (number): Never  Blood glucose trend: Other      Taking Medications:  Diabetes Medication(s)     Incretin Mimetic Agents (GLP-1 Receptor Agonists)       Semaglutide (RYBELSUS) 3 MG TABS    Take 3 mg by mouth daily for 30 days, THEN 7 mg daily for 60 days.          Taking Medication Assessed Today: Yes  Current Treatments: None  Problems taking diabetes medications regularly?: No  Diabetes medication side effects?: No    Problem Solving:  Problem Solving Assessed Today: No  Is the patient at risk for hypoglycemia?: No  Is the patient at risk for DKA?: No              Reducing Risks:  Reducing Risks Assessed Today: Yes  Diabetes Risks: Sedentary Lifestyle  CAD Risks: Diabetes Mellitus, Family history, Obesity, Sedentary lifestyle, Stress, Tobacco exposure  Has dilated eye exam at least once a year?: Yes  Sees dentist every 6 months?: Yes  Feet checked by healthcare provider in the last year?: No    Healthy Coping:  Healthy Coping Assessed Today: Yes  Emotional response to diabetes: Ready to learn  Informal Support system:: Friends, Parent  Stage of change: PREPARATION (Decided to change - considering how)  Patient Activation Measure Survey Score:  JULIETTE Score (Last Two) 5/10/2022   JULIETTE Raw Score 34   Activation Score 68.9   JULIETTE Level 3         Care Plan and Education Provided:  Care Plan: Diabetes   Updates made by Shantel Briones RD since 9/26/2022 12:00 AM      Problem: Diabetes Self-Management Education Needed to Optimize Self-Care Behaviors       Goal: Healthy Eating - follow a healthy eating pattern for diabetes    This Visit's Progress: 100%   Recent Progress: 0%   Note:    Limit regular soda to 5 days/week (1 can)     Goal: Being Active - get regular physical activity, working up to at least 150 minutes per week    This Visit's Progress: 50%   Recent Progress: 0%   Note:     Stand every hour at work 5 days a week and park further away at stores     Goal: Monitoring - monitor glucose and ketones as directed    This Visit's Progress: 0%   Recent Progress: 0%   Note:    Check BG once daily at varying times     Goal: Taking Medication - patient is consistently taking medications as directed    This Visit's Progress: 100%   Recent Progress: 0%   Note:    Take medication as prescribed daily     Goal: Reducing Risks - know how to prevent and treat long-term diabetes complications       Task: Provide education on Hemoglobin A1c - goals and relationship to blood glucose levels Completed 9/26/2022   Responsible User: Shantel Briones RD      Goal: Healthy Coping - use available resources to cope with the challenges of managing diabetes       Task: Discuss recognizing feelings about having diabetes Completed 9/26/2022   Responsible User: Shantel Briones RD      Task: Provide education on the benefits of making appropriate lifestyle changes Completed 9/26/2022            Time Spent: 43 minutes  Encounter Type: Individual    Any diabetes medication dose changes were made via the CDE Protocol per the patient's referring provider. A copy of this encounter was shared with the provider.

## 2022-09-28 RX ORDER — ORAL SEMAGLUTIDE 7 MG/1
7 TABLET ORAL DAILY
Qty: 90 TABLET | Refills: 1 | Status: SHIPPED | OUTPATIENT
Start: 2022-09-28

## 2022-10-16 DIAGNOSIS — E11.9 TYPE 2 DIABETES MELLITUS WITHOUT COMPLICATION, WITHOUT LONG-TERM CURRENT USE OF INSULIN (H): ICD-10-CM

## 2022-10-17 ENCOUNTER — MYC MEDICAL ADVICE (OUTPATIENT)
Dept: EDUCATION SERVICES | Facility: CLINIC | Age: 42
End: 2022-10-17

## 2022-10-17 RX ORDER — LOVASTATIN 20 MG
20 TABLET ORAL AT BEDTIME
Qty: 90 TABLET | Refills: 1 | Status: SHIPPED | OUTPATIENT
Start: 2022-10-17

## 2022-10-17 NOTE — TELEPHONE ENCOUNTER
"Last Written Prescription Date:  7/22/22  Last Fill Quantity: 90,  # refills: 0   Last office visit provider:  7/22/22     Requested Prescriptions   Pending Prescriptions Disp Refills     lovastatin (MEVACOR) 20 MG tablet [Pharmacy Med Name: LOVASTATIN 20MG TABLETS] 90 tablet 0     Sig: TAKE 1 TABLET(20 MG) BY MOUTH AT BEDTIME       Statins Protocol Passed - 10/16/2022  3:36 AM        Passed - LDL on file in past 12 months     Recent Labs   Lab Test 03/31/22  1456                Passed - No abnormal creatine kinase in past 12 months     No lab results found.             Passed - Recent (12 mo) or future (30 days) visit within the authorizing provider's specialty     Patient has had an office visit with the authorizing provider or a provider within the authorizing providers department within the previous 12 mos or has a future within next 30 days. See \"Patient Info\" tab in inbasket, or \"Choose Columns\" in Meds & Orders section of the refill encounter.              Passed - Medication is active on med list        Passed - Patient is age 18 or older        Passed - No active pregnancy on record        Passed - No positive pregnancy test in past 12 months             Jose Oliva RN 10/17/22 8:29 AM  "

## 2022-10-27 ENCOUNTER — TELEPHONE (OUTPATIENT)
Dept: EDUCATION SERVICES | Facility: CLINIC | Age: 42
End: 2022-10-27

## 2022-10-27 NOTE — TELEPHONE ENCOUNTER
Spoke with patient.  Started Rybelesus 7mg about 20 days ago.  About 2 weeks ago abdominal pain off and on started.  Last week decreased appetite, unsure if related to Rybelsus or something else.  Tuesday no appetite, Wednesday started vomiting.  Today unable to keep anything down, not even water.    Other symptoms:  Fatigue  Headache  Weak  Acid feeling in throat  Chills feels hot and sweaty  No fever yesterday  Advised to stop Rybelsus and go to walk-in care.  Try drinking beverages with sugar, ginger ale, 7-albina hernandez.  Asked patient if she wanted an appointment with primary tomorrow, declined.  She is working in [place] until tomorrow morning. Advised of walk-in clinic near her.

## 2022-10-27 NOTE — TELEPHONE ENCOUNTER
Patient called. She has been feeling sick for about 1 week now and would like a call back from CDE. She's not sure if it's her Diabetes medications that is making her sick. She would appreciate a call back today.   I also informed the patient to contact her PCP about her symptoms as well.

## 2022-10-28 ENCOUNTER — HOSPITAL ENCOUNTER (EMERGENCY)
Facility: CLINIC | Age: 42
Discharge: LEFT BEFORE DISCHARGE | End: 2022-10-28
Payer: COMMERCIAL

## 2022-10-28 VITALS
WEIGHT: 293 LBS | HEIGHT: 65 IN | BODY MASS INDEX: 48.82 KG/M2 | TEMPERATURE: 98.1 F | OXYGEN SATURATION: 96 % | SYSTOLIC BLOOD PRESSURE: 155 MMHG | HEART RATE: 101 BPM | DIASTOLIC BLOOD PRESSURE: 112 MMHG | RESPIRATION RATE: 20 BRPM

## 2022-10-28 ASSESSMENT — ACTIVITIES OF DAILY LIVING (ADL): ADLS_ACUITY_SCORE: 35

## 2022-10-28 NOTE — ED NOTES
42 year old female who presents to the urgent care today for nausea, vomiting and diarrhea that started yesterday.  Patient is diaphoretic and recently diagnosed with type 2 diabetes.  She states she supposed to check her blood sugars daily but has not been checking them at all.  She is on Rybelsus and is wondering if this is causing some of the symptoms since she was up to 7 mg daily a few weeks ago and noticed some nausea when taking this.  She denies any known exposures, fevers, chest pain, abdominal pain, nausea currently.  Discussed that I could do a urine, COVID and influenza test today, but cannot work her or rule her out for diabetic ketoacidosis or provide IV fluids here in the urgent care.  Patient states she like to be seen in the emergency department and was sent over to the emergency department waiting room for triage in stable condition.     Bailey Perez PA-C

## 2022-10-28 NOTE — ED TRIAGE NOTES
Pt here with nausea, vomiting and diarrhea since Wednesday. Pt is taking Rybelsus for diabetes and A1c, on her second month, on 7mg for three weeks. Two weeks into her 7mg dose, pt started having abdominal pain which has subsided. Pt is diaphoretic. Pt is unable to eat.      Triage Assessment     Row Name 10/28/22 1248       Triage Assessment (Adult)    Airway WDL WDL       Respiratory WDL    Respiratory WDL WDL       Skin Circulation/Temperature WDL    Skin Circulation/Temperature WDL X;temperature  diaphoretic    Skin Temperature warm       Cardiac WDL    Cardiac WDL WDL       Cognitive/Neuro/Behavioral WDL    Cognitive/Neuro/Behavioral WDL WDL

## 2022-10-29 ENCOUNTER — HOSPITAL ENCOUNTER (EMERGENCY)
Facility: HOSPITAL | Age: 42
Discharge: HOME | End: 2022-10-29
Attending: EMERGENCY MEDICINE | Admitting: EMERGENCY MEDICINE
Payer: COMMERCIAL

## 2022-10-29 VITALS
WEIGHT: 293 LBS | DIASTOLIC BLOOD PRESSURE: 90 MMHG | SYSTOLIC BLOOD PRESSURE: 149 MMHG | BODY MASS INDEX: 50.02 KG/M2 | HEART RATE: 84 BPM | HEIGHT: 64 IN | OXYGEN SATURATION: 95 % | RESPIRATION RATE: 12 BRPM | TEMPERATURE: 98.1 F

## 2022-10-29 DIAGNOSIS — R11.10 VOMITING AND DIARRHEA: ICD-10-CM

## 2022-10-29 DIAGNOSIS — R19.7 VOMITING AND DIARRHEA: ICD-10-CM

## 2022-10-29 LAB
ALBUMIN SERPL BCG-MCNC: 4.5 G/DL (ref 3.5–5.2)
ALP SERPL-CCNC: 135 U/L (ref 35–104)
ALT SERPL W P-5'-P-CCNC: 52 U/L (ref 10–35)
ANION GAP SERPL CALCULATED.3IONS-SCNC: 11 MMOL/L (ref 7–15)
AST SERPL W P-5'-P-CCNC: 45 U/L (ref 10–35)
BILIRUB SERPL-MCNC: 0.5 MG/DL
BUN SERPL-MCNC: 14.3 MG/DL (ref 6–20)
CALCIUM SERPL-MCNC: 9.4 MG/DL (ref 8.6–10)
CHLORIDE SERPL-SCNC: 99 MMOL/L (ref 98–107)
CREAT SERPL-MCNC: 0.78 MG/DL (ref 0.51–0.95)
DEPRECATED HCO3 PLAS-SCNC: 26 MMOL/L (ref 22–29)
ERYTHROCYTE [DISTWIDTH] IN BLOOD BY AUTOMATED COUNT: 15.9 % (ref 10–15)
GFR SERPL CREATININE-BSD FRML MDRD: >90 ML/MIN/1.73M2
GLUCOSE BLDC GLUCOMTR-MCNC: 125 MG/DL (ref 70–99)
GLUCOSE SERPL-MCNC: 106 MG/DL (ref 70–99)
HCT VFR BLD AUTO: 45.9 % (ref 35–47)
HGB BLD-MCNC: 14.7 G/DL (ref 11.7–15.7)
MCH RBC QN AUTO: 26.1 PG (ref 26.5–33)
MCHC RBC AUTO-ENTMCNC: 32 G/DL (ref 31.5–36.5)
MCV RBC AUTO: 81 FL (ref 78–100)
PLATELET # BLD AUTO: 334 10E3/UL (ref 150–450)
POTASSIUM SERPL-SCNC: 3.8 MMOL/L (ref 3.4–5.3)
PROT SERPL-MCNC: 8.1 G/DL (ref 6.4–8.3)
RBC # BLD AUTO: 5.64 10E6/UL (ref 3.8–5.2)
SODIUM SERPL-SCNC: 136 MMOL/L (ref 136–145)
WBC # BLD AUTO: 8.7 10E3/UL (ref 4–11)

## 2022-10-29 PROCEDURE — 258N000003 HC RX IP 258 OP 636: Performed by: EMERGENCY MEDICINE

## 2022-10-29 PROCEDURE — 250N000011 HC RX IP 250 OP 636: Performed by: EMERGENCY MEDICINE

## 2022-10-29 PROCEDURE — 99284 EMERGENCY DEPT VISIT MOD MDM: CPT | Mod: 25

## 2022-10-29 PROCEDURE — 85027 COMPLETE CBC AUTOMATED: CPT | Performed by: EMERGENCY MEDICINE

## 2022-10-29 PROCEDURE — 96361 HYDRATE IV INFUSION ADD-ON: CPT

## 2022-10-29 PROCEDURE — 36415 COLL VENOUS BLD VENIPUNCTURE: CPT | Performed by: EMERGENCY MEDICINE

## 2022-10-29 PROCEDURE — 80053 COMPREHEN METABOLIC PANEL: CPT | Performed by: EMERGENCY MEDICINE

## 2022-10-29 PROCEDURE — 96374 THER/PROPH/DIAG INJ IV PUSH: CPT

## 2022-10-29 PROCEDURE — 82040 ASSAY OF SERUM ALBUMIN: CPT | Performed by: EMERGENCY MEDICINE

## 2022-10-29 RX ORDER — ONDANSETRON 4 MG/1
4 TABLET, ORALLY DISINTEGRATING ORAL EVERY 8 HOURS PRN
Qty: 10 TABLET | Refills: 0 | Status: SHIPPED | OUTPATIENT
Start: 2022-10-29 | End: 2022-11-01

## 2022-10-29 RX ORDER — ONDANSETRON 2 MG/ML
4 INJECTION INTRAMUSCULAR; INTRAVENOUS ONCE
Status: COMPLETED | OUTPATIENT
Start: 2022-10-29 | End: 2022-10-29

## 2022-10-29 RX ORDER — SODIUM CHLORIDE 9 MG/ML
INJECTION, SOLUTION INTRAVENOUS CONTINUOUS
Status: DISCONTINUED | OUTPATIENT
Start: 2022-10-29 | End: 2022-10-29 | Stop reason: HOSPADM

## 2022-10-29 RX ORDER — LOPERAMIDE HYDROCHLORIDE 2 MG/1
2 TABLET ORAL 4 TIMES DAILY PRN
Qty: 12 TABLET | Refills: 0 | Status: SHIPPED | OUTPATIENT
Start: 2022-10-29

## 2022-10-29 RX ADMIN — SODIUM CHLORIDE 1000 ML: 9 INJECTION, SOLUTION INTRAVENOUS at 12:28

## 2022-10-29 RX ADMIN — ONDANSETRON 4 MG: 2 INJECTION INTRAMUSCULAR; INTRAVENOUS at 12:29

## 2022-10-29 ASSESSMENT — ACTIVITIES OF DAILY LIVING (ADL): ADLS_ACUITY_SCORE: 35

## 2022-10-29 NOTE — ED PROVIDER NOTES
"EMERGENCY DEPARTMENT ENCOUNTER      NAME: Natalie J Fritsch  AGE: 42 year old female  YOB: 1980  MRN: [id number]  EVALUATION DATE & TIME: 10/29/2022 11:49 AM    PCP: Freda Patel    ED PROVIDER: Palomo Virk M.D.      Chief Complaint   Patient presents with     Nausea, Vomiting, & Diarrhea         FINAL IMPRESSION:  Vomiting and diarrhea  Medication side effect  ED COURSE & MEDICAL DECISION MAKING:    Pertinent Labs & Imaging studies reviewed. (See chart for details)  42 year old female presents to the Emergency Department for evaluation of nausea, vomiting diarrhea.  Symptoms started a few days ago.  Believes it may be related to \"food poisoning\".  However patient started on Rybelsus a few months ago for her diabetes and morbid obesity.  Recent increase in dosage.  Patient discontinued the medication on Wednesday.  Symptoms persisting.  Minimal abdominal cramping.  No fevers or chills.  On exam she is a morbidly obese female in mild distress heart and lungs unremarkable.  Abdomen obese soft and nontender.  Patient with potential food related or virally mediated vomiting diarrhea possibility of medication related.  We will treat symptomatically at baseline blood work being obtained assess for electrolyte imbalance.. Patient appears non toxic with stable vitals signs. Overall exam is benign.        12:09 PM I met with the patient for the initial interview and physical examination. Discussed plan for treatment and workup in the ED.    12:15 PM.  Medication with significant side effects of nausea, vomiting diarrhea.  Likely related to her symptoms.  1:17 PM.  Laboratory evaluation significant for slight elevation of transaminases.  However patient had prior cholecystectomy.  Patient likely with side effects of medication and dietary indiscretions earlier in the week.  Recommendations are for continued outpatient management.  Zofran given for nausea and vomiting.  She should discuss continuation of the " medication with her primary care physician.  At the conclusion of the encounter I discussed the results of all of the tests and the disposition. The questions were answered and return precautions provided. The patient or family acknowledged understanding and was agreeable with the care plan.       PPE: Provider wore gloves, N95 mask    MEDICATIONS GIVEN IN THE EMERGENCY:  Medications   0.9% sodium chloride BOLUS (has no administration in time range)     Followed by   sodium chloride 0.9% infusion (has no administration in time range)   ondansetron (ZOFRAN) injection 4 mg (has no administration in time range)       NEW PRESCRIPTIONS STARTED AT TODAY'S ER VISIT  Current Discharge Medication List      START taking these medications    Details   loperamide (IMODIUM A-D) 2 MG tablet Take 1 tablet (2 mg) by mouth 4 times daily as needed for diarrhea  Qty: 12 tablet, Refills: 0      ondansetron (ZOFRAN ODT) 4 MG ODT tab Take 1 tablet (4 mg) by mouth every 8 hours as needed for nausea  Qty: 10 tablet, Refills: 0                =================================================================    HPI    Patient information was obtained from: Patient    Use of Intrepreter: N/A       Natalie J Fritsch is a 42 year old female with a pertient medical history of DM II, morbid obesiy  who presents to the ED for evaluation of nausea, vomiting and diarrhea.    The patient reports that she has been having episodes of emesis since Wednesday (10/26) morning. In addition she has been having nausea and diarrhea.     She states that her diarrhea started this Friday (10/28), and has been orange colored.  She recently started rebelsis, a new medication apart of her diabetes care plan, and she reports that she     She denies any abdominal pain or cramps at the time of the encounter. Her LMP was    REVIEW OF SYSTEMS   Constitutional:  Denies fever, chills  Respiratory:  Denies productive cough or increased work of breathing  Cardiovascular:   Denies chest pain, palpitations  GI:  Denies abdominal pain, nausea, vomiting, or change in bowel or bladder habits   Musculoskeletal:  Denies any new muscle/joint swelling  Skin:  Denies rash   Neurologic:  Denies focal weakness  All systems negative except as marked.     PAST MEDICAL HISTORY:  Past Medical History:   Diagnosis Date     Abnormal Pap smear of cervix     Created by Conversion      Acute cholecystitis 8/27/2017     Anal fissure      Bipolar disorder (H)      Calculus of kidney      Cholecystitis 8/27/2017    Added automatically from request for surgery [id number]     Depressive disorder 2003     Diabetes (H) 4 months ago     H/O borderline personality disorder 10/21/2016     Obese        PAST SURGICAL HISTORY:  Past Surgical History:   Procedure Laterality Date     LAPAROSCOPIC CHOLECYSTECTOMY N/A 08/28/2017    Procedure: CHOLECYSTECTOMY, LAPAROSCOPIC;  Surgeon: Vinay Jolly MD;  Location: [facility];  Service:      LITHOTRIPSY           CURRENT MEDICATIONS:      Current Facility-Administered Medications:      0.9% sodium chloride BOLUS, 1,000 mL, Intravenous, Once **FOLLOWED BY** sodium chloride 0.9% infusion, , Intravenous, Continuous, Palomo Virk MD     ondansetron (ZOFRAN) injection 4 mg, 4 mg, Intravenous, Once, Palomo Virk MD    Current Outpatient Medications:      blood glucose (ACCU-CHEK GUIDE) test strip, Use to test blood sugar once daily., Disp: 100 strip, Rfl: 3     blood glucose (NO BRAND SPECIFIED) lancets standard, Use to test blood sugar 4 times daily or as directed., Disp: 100 each, Rfl: 3     blood glucose (NO BRAND SPECIFIED) test strip, Use to test blood sugar 4 times daily or as directed., Disp: 100 strip, Rfl: 3     blood glucose monitoring (NO BRAND SPECIFIED) meter device kit, Use to test blood sugar 4 times daily or as directed., Disp: 1 kit, Rfl: 0     blood glucose monitoring (SOFTCLIX) lancets, Use to test blood sugar 1 times daily., Disp: 100 each, Rfl:  4     cyclobenzaprine (FLEXERIL) 10 MG tablet, Take 10 mg by mouth 3 times daily as needed for muscle spasms, Disp: , Rfl:      etonogestrel (NEXPLANON) 68 MG IMPL, Inject 68 mg Subcutaneous, Disp: , Rfl:      ferrous sulfate (FEROSUL) 325 (65 Fe) MG tablet, Take 1 tablet (325 mg) by mouth daily (with breakfast), Disp: 30 tablet, Rfl: 1     fluticasone (FLONASE) 50 MCG/ACT nasal spray, Spray 2 sprays into both nostrils daily, Disp: 16 g, Rfl: 3     guanFACINE (INTUNIV) 2 MG TB24 24 hr tablet, Take 2 mg by mouth, Disp: , Rfl:      lovastatin (MEVACOR) 20 MG tablet, Take 1 tablet (20 mg) by mouth At Bedtime, Disp: 90 tablet, Rfl: 1     meclizine (ANTIVERT) 25 MG tablet, Take 1 tablet (25 mg) by mouth 3 times daily as needed for dizziness, Disp: 30 tablet, Rfl: 0     Semaglutide (RYBELSUS) 3 MG TABS, Take 3 mg by mouth daily for 30 days, THEN 7 mg daily for 60 days., Disp: 90 tablet, Rfl: 1     Semaglutide (RYBELSUS) 7 MG TABS, Take 7 mg by mouth daily Every morning with less than 4 oz of water - wait 30 minutes prior to eating or drinking, Disp: 90 tablet, Rfl: 1     venlafaxine (EFFEXOR-ER) 225 MG 24 hr tablet, , Disp: , Rfl:     ALLERGIES:  Allergies   Allergen Reactions     Cat Hair Extract      Penicillin V        FAMILY HISTORY:  Family History   Problem Relation Age of Onset     Substance Abuse Mother         Alcohol     Mental Illness Mother         Narcissistic PD and Histrionic PD     Hypertension Mother      Substance Abuse Father         Polysubstance mainly ETOH     Mental Illness Father         PTSD ASPD     Other Cancer Father         Mouth cancer HPV strains for alcohol and tobacco     Depression Father      Anesthesia Reaction Father      Obesity Father      Anxiety Disorder Maternal Grandmother      Hypertension Maternal Grandmother      Substance Abuse Paternal Grandfather         ETOH     Hypertension Maternal Grandfather      Cerebrovascular Disease Maternal Grandfather      Depression Other   "      SOCIAL HISTORY:   Social History     Socioeconomic History     Marital status: Single     Spouse name: None     Number of children: None     Years of education: None     Highest education level: None   Tobacco Use     Smoking status: Every Day     Packs/day: 0.50     Years: 17.00     Pack years: 8.50     Types: Cigarettes     Smokeless tobacco: Never   Vaping Use     Vaping Use: Every day     Substances: Nicotine     Devices: Pre-filled pod   Substance and Sexual Activity     Alcohol use: Not Currently     Comment: Alcoholic Drinks/day: social      Drug use: No     Comment: history of meth and cocaine abuse; sober for 3 years ago     Sexual activity: Not Currently     Partners: Male     Birth control/protection: Implant       VITALS:  Patient Vitals for the past 24 hrs:   BP Temp Temp src Pulse Resp SpO2 Height Weight   10/29/22 1200 (!) 160/97 -- -- -- -- -- -- --   10/29/22 1150 (!) 172/95 98.1  F (36.7  C) Tympanic 106 12 97 % 1.626 m (5' 4\") (!) 183.3 kg (404 lb)        PHYSICAL EXAM    Constitutional:  Awake, alert, in no apparent distress  HENT:  Normocephalic, Atraumatic. Bilateral external ears normal. Oropharynx moist. Nose normal. Neck- Normal range of motion  Eyes:  PERRL, EOMI with no signs of entrapment, Conjunctiva normal, No discharge.   Respiratory:  Normal breath sounds, No respiratory distress, No wheezing.    Cardiovascular:  Normal heart rate, Normal rhythm, No appreciable rubs or gallops.   GI:  Soft, No tenderness, No distension, No palpable masses  Musculoskeletal No edema. Good range of motion in all major joints.   Integument:  Warm, Dry, No erythema, No rash.   Neurologic:  Alert & oriented, Normal motor function, Normal sensory function, No focal deficits noted.   Psychiatric:  Affect normal, Judgment normal, Mood normal.     LAB:  All pertinent labs reviewed and interpreted.  Results for orders placed or performed during the hospital encounter of 10/29/22   Comprehensive metabolic " panel     Status: Abnormal   Result Value Ref Range    Sodium 136 136 - 145 mmol/L    Potassium 3.8 3.4 - 5.3 mmol/L    Chloride 99 98 - 107 mmol/L    Carbon Dioxide (CO2) 26 22 - 29 mmol/L    Anion Gap 11 7 - 15 mmol/L    Urea Nitrogen 14.3 6.0 - 20.0 mg/dL    Creatinine 0.78 0.51 - 0.95 mg/dL    Calcium 9.4 8.6 - 10.0 mg/dL    Glucose 106 (H) 70 - 99 mg/dL    Alkaline Phosphatase 135 (H) 35 - 104 U/L    AST 45 (H) 10 - 35 U/L    ALT 52 (H) 10 - 35 U/L    Protein Total 8.1 6.4 - 8.3 g/dL    Albumin 4.5 3.5 - 5.2 g/dL    Bilirubin Total 0.5 <=1.2 mg/dL    GFR Estimate >90 >60 mL/min/1.73m2   CBC (+ platelets, no diff)     Status: Abnormal   Result Value Ref Range    WBC Count 8.7 4.0 - 11.0 10e3/uL    RBC Count 5.64 (H) 3.80 - 5.20 10e6/uL    Hemoglobin 14.7 11.7 - 15.7 g/dL    Hematocrit 45.9 35.0 - 47.0 %    MCV 81 78 - 100 fL    MCH 26.1 (L) 26.5 - 33.0 pg    MCHC 32.0 31.5 - 36.5 g/dL    RDW 15.9 (H) 10.0 - 15.0 %    Platelet Count 334 150 - 450 10e3/uL   Glucose by meter     Status: Abnormal   Result Value Ref Range    GLUCOSE BY METER POCT 125 (H) 70 - 99 mg/dL         I, TERESA OSBORN, am serving as a scribe to document services personally performed by Palomo Virk MD, based on my observation and the provider's statements to me. I, Palomo Virk MD attest that TREESA OSBORN is acting in a scribe capacity, has observed my performance of the services and has documented them in accordance with my direction.    Palomo Virk M.D.  Emergency Medicine  [facility] EMERGENCY DEPARTMENT     Palomo Virk MD  10/29/22 5467       Palomo Virk MD  10/29/22 7070

## 2022-10-29 NOTE — ED TRIAGE NOTES
Pt has had vomiting since Tuesday, matty when she tried to eat or drink anything.  Has had diarrhea since Friday. Has chills. Pt has started a new diabetic oral agent.

## 2022-10-29 NOTE — DISCHARGE INSTRUCTIONS
Your medications certainly could have called your symptoms versus simple food related or viral illness.  Take Zofran to help with nausea and vomiting.  Take the loperamide as needed for diarrhea.

## 2023-01-05 NOTE — TELEPHONE ENCOUNTER
Patient Returning Call  Reason for call:  The patient is returning the call.  Information relayed to patient:  Below message has been relayed to the patient.   Patient has additional questions:  No  If YES, what are your questions/concerns:  NA  Okay to leave a detailed message?: No call back needed   Sski Pregnancy And Lactation Text: This medication is Pregnancy Category D and isn't considered safe during pregnancy. It is excreted in breast milk.

## 2023-01-18 ENCOUNTER — HOSPITAL ENCOUNTER (EMERGENCY)
Facility: CLINIC | Age: 43
Discharge: HOME | End: 2023-01-18
Attending: EMERGENCY MEDICINE | Admitting: EMERGENCY MEDICINE
Payer: COMMERCIAL

## 2023-01-18 VITALS
DIASTOLIC BLOOD PRESSURE: 62 MMHG | WEIGHT: 293 LBS | SYSTOLIC BLOOD PRESSURE: 131 MMHG | BODY MASS INDEX: 48.82 KG/M2 | TEMPERATURE: 97.9 F | OXYGEN SATURATION: 95 % | HEART RATE: 80 BPM | RESPIRATION RATE: 16 BRPM | HEIGHT: 65 IN

## 2023-01-18 DIAGNOSIS — M54.6 ACUTE LEFT-SIDED THORACIC BACK PAIN: ICD-10-CM

## 2023-01-18 LAB
ALBUMIN UR-MCNC: 10 MG/DL
APPEARANCE UR: ABNORMAL
BACTERIA #/AREA URNS HPF: ABNORMAL /HPF
BILIRUB UR QL STRIP: NEGATIVE
COLOR UR AUTO: YELLOW
GLUCOSE UR STRIP-MCNC: NEGATIVE MG/DL
HCG UR QL: NEGATIVE
HGB UR QL STRIP: NEGATIVE
KETONES UR STRIP-MCNC: NEGATIVE MG/DL
LEUKOCYTE ESTERASE UR QL STRIP: NEGATIVE
MUCOUS THREADS #/AREA URNS LPF: PRESENT /LPF
NITRATE UR QL: NEGATIVE
PH UR STRIP: 6 [PH] (ref 5–7)
RBC URINE: 2 /HPF
SP GR UR STRIP: 1.03 (ref 1–1.03)
SQUAMOUS EPITHELIAL: 8 /HPF
UROBILINOGEN UR STRIP-MCNC: <2 MG/DL
WBC URINE: 2 /HPF

## 2023-01-18 PROCEDURE — 81025 URINE PREGNANCY TEST: CPT | Performed by: EMERGENCY MEDICINE

## 2023-01-18 PROCEDURE — 99284 EMERGENCY DEPT VISIT MOD MDM: CPT

## 2023-01-18 PROCEDURE — 250N000011 HC RX IP 250 OP 636: Performed by: EMERGENCY MEDICINE

## 2023-01-18 PROCEDURE — 96372 THER/PROPH/DIAG INJ SC/IM: CPT | Performed by: EMERGENCY MEDICINE

## 2023-01-18 PROCEDURE — 81001 URINALYSIS AUTO W/SCOPE: CPT | Performed by: EMERGENCY MEDICINE

## 2023-01-18 RX ORDER — CYCLOBENZAPRINE HCL 10 MG
10 TABLET ORAL 3 TIMES DAILY PRN
Qty: 20 TABLET | Refills: 0 | Status: SHIPPED | OUTPATIENT
Start: 2023-01-18 | End: 2023-01-24

## 2023-01-18 RX ORDER — ORPHENADRINE CITRATE 30 MG/ML
60 INJECTION INTRAMUSCULAR; INTRAVENOUS ONCE
Status: COMPLETED | OUTPATIENT
Start: 2023-01-18 | End: 2023-01-18

## 2023-01-18 RX ORDER — KETOROLAC TROMETHAMINE 30 MG/ML
60 INJECTION, SOLUTION INTRAMUSCULAR; INTRAVENOUS ONCE
Status: COMPLETED | OUTPATIENT
Start: 2023-01-18 | End: 2023-01-18

## 2023-01-18 RX ADMIN — KETOROLAC TROMETHAMINE 60 MG: 30 INJECTION, SOLUTION INTRAMUSCULAR; INTRAVENOUS at 05:01

## 2023-01-18 RX ADMIN — ORPHENADRINE CITRATE 60 MG: 30 INJECTION INTRAMUSCULAR; INTRAVENOUS at 05:05

## 2023-01-18 ASSESSMENT — ACTIVITIES OF DAILY LIVING (ADL): ADLS_ACUITY_SCORE: 33

## 2023-01-18 ASSESSMENT — ENCOUNTER SYMPTOMS
BACK PAIN: 1
HEMATURIA: 0

## 2023-01-18 NOTE — ED TRIAGE NOTES
Pt reports a 1 day hx of intermittent back and left flank pain.  Denies any N/V/ D. No difficulty with bowel or bladder     Triage Assessment     Row Name 01/18/23 2950       Triage Assessment (Adult)    Airway WDL WDL       Respiratory WDL    Respiratory WDL WDL       Skin Circulation/Temperature WDL    Skin Circulation/Temperature WDL WDL       Cardiac WDL    Cardiac WDL WDL       Peripheral/Neurovascular WDL    Peripheral Neurovascular WDL WDL       Cognitive/Neuro/Behavioral WDL    Cognitive/Neuro/Behavioral WDL WDL

## 2023-01-18 NOTE — ED NOTES
Pain assessment: Pt states that pain has not really improved since medication administration. States she should be able to give urine sample and will try. Patient informed to hit call light when sample is available.

## 2023-01-18 NOTE — ED PROVIDER NOTES
EMERGENCY DEPARTMENT ENCOUNTER     NAME: Natalie J Fritsch   AGE: 42 year old female   YOB: 1980   MRN: [id number]   EVALUATION DATE & TIME: No admission date for patient encounter.   PCP: Freda Patel     Chief Complaint   Patient presents with     Flank Pain     Back Pain     upper   :    FINAL IMPRESSION       1. Acute left-sided thoracic back pain           ED COURSE & MEDICAL DECISION MAKING      Pertinent Labs & Imaging studies reviewed. (See chart for details)   42 year old female  presents to the Emergency Department for evaluation of left-sided upper back pain. Initial Vitals Reviewed. Initial exam notable for orbitally obese patient with no CVA tenderness, tenderness over the left posterior thoracic paraspinal musculature.  I suspect musculoskeletal strain and I am much less suspicious of pyelonephritis, nephrolithiasis.  No red flags for cauda equina syndrome or epidural abscess.  She was treated with Toradol and Norflex here with improvement.  Urine pregnancy is negative and urinalysis shows no hematuria or signs of infection.  On reassessment, patient is comfortable with the likely diagnosis of musculoskeletal strain.  I will discharge with a prescription for Flexeril, rice instructions.  I am much less suspicious of nephrolithiasis as she is quite comfortable appearing and her pain is located up in the thoracic area and not near the CVA, no other symptoms like urinary symptoms or vomiting.  We discussed doing a CT scan to evaluate, but will avoid the radiation at this point with a low suspicion and she will return if things worsen.  Patient was in agreement with this plan and was discharged in stable improved condition.      4:45 AM I met with the patient and performed my initial interview and exam      At the conclusion of the encounter I discussed the results of all of the tests and the disposition. The questions were answered. The patient or family acknowledged understanding and was  "agreeable with the care plan.         Medical Decision Making    History:    Supplemental history from: Documented in chart, if applicable    External Record(s) reviewed: Documented in chart, if applicable. and Outpatient Record: previous ED visits    Work Up:    Chart documentation includes differential considered and any EKGs or imaging independently interpreted by provider, where specified.    In additional to work up documented, I considered the following work up: Documented in chart, if applicable.    External consultation:    Discussion of management with another provider: Documented in chart, if applicable    Complicating factors:    Care impacted by chronic illness: Other: Morbid obesity    Care affected by social determinants of health: N/A    Disposition considerations: Discharge. I prescribed additional prescription strength medication(s) as charted. Admission consideration documented above, if applicable.        MEDICATIONS GIVEN IN THE EMERGENCY:   Medications - No data to display   NEW PRESCRIPTIONS STARTED AT TODAY'S ER VISIT   New Prescriptions    No medications on file     ================================================================   HISTORY OF PRESENT ILLNESS       Patient information was obtained from: Patient    Use of Intrepreter: N/A     Natalie J Fritsch is a 42 year old female with history of morbid obesity, DM type II, kidney stones (self reported), and back pain who presents to this ED via walk-in with a chief complaint of back pain    Patient reports left sided back pain that began \"out of nowhere\" yesterday afternoon. They report that it \"sometimes\" radiates to the front and \"it burns\". They took ibuprofen and tylenol without relief. They woke up this morning and it was \"excruciating\" so they came to the ED. The patient notes a history of kidney stones and states that this feels similar. The patient denies incontinence, hematuria, or any other symptoms or complaints. "     ================================================================    REVIEW OF SYSTEMS       Review of Systems   Genitourinary: Negative for hematuria.   Musculoskeletal: Positive for back pain (left side).   All other systems reviewed and are negative.        PAST HISTORY     PAST MEDICAL HISTORY:   Past Medical History:   Diagnosis Date     Abnormal Pap smear of cervix     Created by Conversion      Acute cholecystitis 8/27/2017     Anal fissure      Bipolar disorder (H)      Calculus of kidney      Cholecystitis 8/27/2017    Added automatically from request for surgery [id number]     Depressive disorder 2003     Diabetes (H) 4 months ago     H/O borderline personality disorder 10/21/2016     Obese       PAST SURGICAL HISTORY:   Past Surgical History:   Procedure Laterality Date     LAPAROSCOPIC CHOLECYSTECTOMY N/A 08/28/2017    Procedure: CHOLECYSTECTOMY, LAPAROSCOPIC;  Surgeon: Vinay Jolly MD;  Location: [facility];  Service:      LITHOTRIPSY        CURRENT MEDICATIONS:   blood glucose (ACCU-CHEK GUIDE) test strip  blood glucose (NO BRAND SPECIFIED) lancets standard  blood glucose (NO BRAND SPECIFIED) test strip  blood glucose monitoring (NO BRAND SPECIFIED) meter device kit  blood glucose monitoring (SOFTCLIX) lancets  cyclobenzaprine (FLEXERIL) 10 MG tablet  etonogestrel (NEXPLANON) 68 MG IMPL  ferrous sulfate (FEROSUL) 325 (65 Fe) MG tablet  fluticasone (FLONASE) 50 MCG/ACT nasal spray  guanFACINE (INTUNIV) 2 MG TB24 24 hr tablet  loperamide (IMODIUM A-D) 2 MG tablet  lovastatin (MEVACOR) 20 MG tablet  meclizine (ANTIVERT) 25 MG tablet  Semaglutide (RYBELSUS) 7 MG TABS  venlafaxine (EFFEXOR-ER) 225 MG 24 hr tablet      ALLERGIES:   Allergies   Allergen Reactions     Cat Hair Extract      Penicillin V       FAMILY HISTORY:   Family History   Problem Relation Age of Onset     Substance Abuse Mother         Alcohol     Mental Illness Mother         Narcissistic PD and Histrionic PD     Hypertension  "Mother      Substance Abuse Father         Polysubstance mainly ETOH     Mental Illness Father         PTSD ASPD     Other Cancer Father         Mouth cancer HPV strains for alcohol and tobacco     Depression Father      Anesthesia Reaction Father      Obesity Father      Anxiety Disorder Maternal Grandmother      Hypertension Maternal Grandmother      Substance Abuse Paternal Grandfather         ETOH     Hypertension Maternal Grandfather      Cerebrovascular Disease Maternal Grandfather      Depression Other       SOCIAL HISTORY:   Social History     Socioeconomic History     Marital status: Single   Tobacco Use     Smoking status: Every Day     Packs/day: 0.50     Years: 17.00     Pack years: 8.50     Types: Cigarettes     Smokeless tobacco: Never   Vaping Use     Vaping Use: Every day     Substances: Nicotine     Devices: Pre-filled pod   Substance and Sexual Activity     Alcohol use: Not Currently     Comment: Alcoholic Drinks/day: social      Drug use: No     Comment: history of meth and cocaine abuse; sober for 3 years ago     Sexual activity: Not Currently     Partners: Male     Birth control/protection: Implant        VITALS  Patient Vitals for the past 24 hrs:   BP Temp Temp src Pulse Resp SpO2 Height Weight   01/18/23 0518 122/59 -- -- 86 -- 94 % -- --   01/18/23 0503 119/65 -- -- -- -- -- -- --   01/18/23 0442 (!) 129/90 97.9  F (36.6  C) Temporal 98 22 96 % 1.645 m (5' 4.75\") (!) 183.3 kg (404 lb)        ================================================================    PHYSICAL EXAM     VITAL SIGNS: /59   Pulse 86   Temp 97.9  F (36.6  C) (Temporal)   Resp 22   Ht 1.645 m (5' 4.75\")   Wt (!) 183.3 kg (404 lb)   SpO2 94%   BMI 67.75 kg/m     Constitutional:  Awake, no acute distress. Patient is morbidly obese  HENT:  Atraumatic, oropharynx without exudate or erythema, membranes moist  Lymph:  No adenopathy  Eyes: EOM intact, PERRL, no injection  Neck: Supple  Respiratory:  Clear to " auscultation bilaterally, no wheezes or crackles   Cardiovascular:  Regular rate and rhythm, single S1 and S2   GI:  Soft, nontender, nondistended, no rebound or guarding   Musculoskeletal:  Moves all extremities, no lower extremity edema, no deformities. Left thoracic perispinal tenderness, no CVA tenderness, patient is ambulatory without a limp   Skin:  Warm, dry  Neurologic:  Alert and oriented x3, no focal deficits noted       ================================================================  LAB       All pertinent labs reviewed and interpreted.   Labs Ordered and Resulted from Time of ED Arrival to Time of ED Departure   ROUTINE UA WITH MICROSCOPIC REFLEX TO CULTURE - Abnormal       Result Value    Color Urine Yellow      Appearance Urine Turbid (*)     Glucose Urine Negative      Bilirubin Urine Negative      Ketones Urine Negative      Specific Gravity Urine 1.031 (*)     Blood Urine Negative      pH Urine 6.0      Protein Albumin Urine 10 (*)     Urobilinogen Urine <2.0      Nitrite Urine Negative      Leukocyte Esterase Urine Negative      Bacteria Urine Few (*)     Mucus Urine Present (*)     RBC Urine 2      WBC Urine 2      Squamous Epithelials Urine 8 (*)    HCG QUALITATIVE URINE - Normal    hCG Urine Qualitative Negative          ===============================================================  RADIOLOGY       Reviewed all pertinent imaging. Please see official radiology report.   No orders to display         ================================================================  EKG         I have independently reviewed and interpreted the EKG(s) documented above.     ================================================================  Lorrie POWERS Coggio, am serving as a scribe to document services personally performed by Dr. Fox based on my observation and the provider's statements to me. I, Carmella Fox MD attest that Lorrie Lombardi is acting in a scribe capacity, has observed my  performance of the services and has documented them in accordance with my direction.     Carmella Fox M.D.   Emergency Medicine   [facility] EMERGENCY ROOM  [address] [address]  [phone]  Dept: [phone]      Carmella Fox MD  01/18/23 0614

## 2023-04-22 ENCOUNTER — HEALTH MAINTENANCE LETTER (OUTPATIENT)
Age: 43
End: 2023-04-22

## 2023-06-19 DIAGNOSIS — R42 DIZZINESS: ICD-10-CM

## 2023-06-19 RX ORDER — FLUTICASONE PROPIONATE 50 MCG
SPRAY, SUSPENSION (ML) NASAL
Qty: 16 G | Refills: 0 | Status: SHIPPED | OUTPATIENT
Start: 2023-06-19

## 2023-06-19 RX ORDER — FLUTICASONE PROPIONATE 50 MCG
SPRAY, SUSPENSION (ML) NASAL
Qty: 48 G | OUTPATIENT
Start: 2023-06-19

## 2023-06-19 NOTE — TELEPHONE ENCOUNTER
This refill request is a duplicate request, previously received or sent.  Sent denial notification to pharmacy.    KIET MoranN, RN  [facility]

## 2023-06-19 NOTE — TELEPHONE ENCOUNTER
"Last Written Prescription Date:  7/22/2022  Last Fill Quantity: 16 gm,  # refills: 3   Last office visit provider:  7/22/2022     Requested Prescriptions   Pending Prescriptions Disp Refills     fluticasone (FLONASE) 50 MCG/ACT nasal spray [Pharmacy Med Name: FLUTICASONE 50MCG NASAL SP (120) RX] 16 g 3     Sig: SHAKE LIQUID AND USE 2 SPRAYS IN EACH NOSTRIL DAILY       Nasal Allergy Protocol Passed - 6/19/2023  3:22 AM        Passed - Patient is age 12 or older        Passed - Recent (12 mo) or future (30 days) visit within the authorizing provider's specialty     Patient has had an office visit with the authorizing provider or a provider within the authorizing providers department within the previous 12 mos or has a future within next 30 days. See \"Patient Info\" tab in inbasket, or \"Choose Columns\" in Meds & Orders section of the refill encounter.              Passed - Medication is active on med list             Sharon Washington RN 06/19/23 11:32 AM      "

## 2023-07-15 ENCOUNTER — HEALTH MAINTENANCE LETTER (OUTPATIENT)
Age: 43
End: 2023-07-15

## 2023-09-23 ENCOUNTER — HEALTH MAINTENANCE LETTER (OUTPATIENT)
Age: 43
End: 2023-09-23

## 2023-11-28 ENCOUNTER — LAB REQUISITION (OUTPATIENT)
Dept: LAB | Facility: CLINIC | Age: 43
End: 2023-11-28

## 2023-11-28 DIAGNOSIS — E11.65 TYPE 2 DIABETES MELLITUS WITH HYPERGLYCEMIA (H): ICD-10-CM

## 2023-11-28 PROCEDURE — 80053 COMPREHEN METABOLIC PANEL: CPT | Performed by: PHYSICIAN ASSISTANT

## 2023-11-28 PROCEDURE — 80061 LIPID PANEL: CPT | Performed by: PHYSICIAN ASSISTANT

## 2023-11-29 LAB
ALBUMIN SERPL BCG-MCNC: 4.2 G/DL (ref 3.5–5.2)
ALP SERPL-CCNC: 144 U/L (ref 40–150)
ALT SERPL W P-5'-P-CCNC: 33 U/L (ref 0–50)
ANION GAP SERPL CALCULATED.3IONS-SCNC: 13 MMOL/L (ref 7–15)
AST SERPL W P-5'-P-CCNC: 29 U/L (ref 0–45)
BILIRUB SERPL-MCNC: 0.3 MG/DL
BUN SERPL-MCNC: 10.7 MG/DL (ref 6–20)
CALCIUM SERPL-MCNC: 9.8 MG/DL (ref 8.6–10)
CHLORIDE SERPL-SCNC: 100 MMOL/L (ref 98–107)
CHOLEST SERPL-MCNC: 185 MG/DL
CREAT SERPL-MCNC: 0.65 MG/DL (ref 0.51–0.95)
DEPRECATED HCO3 PLAS-SCNC: 25 MMOL/L (ref 22–29)
EGFRCR SERPLBLD CKD-EPI 2021: >90 ML/MIN/1.73M2
GLUCOSE SERPL-MCNC: 195 MG/DL (ref 70–99)
HDLC SERPL-MCNC: 44 MG/DL
LDLC SERPL CALC-MCNC: 97 MG/DL
NONHDLC SERPL-MCNC: 141 MG/DL
POTASSIUM SERPL-SCNC: 4.3 MMOL/L (ref 3.4–5.3)
PROT SERPL-MCNC: 7.8 G/DL (ref 6.4–8.3)
SODIUM SERPL-SCNC: 138 MMOL/L (ref 135–145)
TRIGL SERPL-MCNC: 221 MG/DL

## 2024-02-10 ENCOUNTER — HEALTH MAINTENANCE LETTER (OUTPATIENT)
Age: 44
End: 2024-02-10

## 2024-03-20 ENCOUNTER — LAB REQUISITION (OUTPATIENT)
Dept: LAB | Facility: CLINIC | Age: 44
End: 2024-03-20

## 2024-03-20 DIAGNOSIS — E11.65 TYPE 2 DIABETES MELLITUS WITH HYPERGLYCEMIA (H): ICD-10-CM

## 2024-03-20 PROCEDURE — 80053 COMPREHEN METABOLIC PANEL: CPT | Performed by: PHYSICIAN ASSISTANT

## 2024-03-20 PROCEDURE — 80061 LIPID PANEL: CPT | Performed by: PHYSICIAN ASSISTANT

## 2024-03-21 LAB
ALBUMIN SERPL BCG-MCNC: 4 G/DL (ref 3.5–5.2)
ALP SERPL-CCNC: 143 U/L (ref 40–150)
ALT SERPL W P-5'-P-CCNC: 24 U/L (ref 0–50)
ANION GAP SERPL CALCULATED.3IONS-SCNC: 13 MMOL/L (ref 7–15)
AST SERPL W P-5'-P-CCNC: 18 U/L (ref 0–45)
BILIRUB SERPL-MCNC: 0.3 MG/DL
BUN SERPL-MCNC: 7.8 MG/DL (ref 6–20)
CALCIUM SERPL-MCNC: 9.2 MG/DL (ref 8.6–10)
CHLORIDE SERPL-SCNC: 104 MMOL/L (ref 98–107)
CHOLEST SERPL-MCNC: 135 MG/DL
CREAT SERPL-MCNC: 0.76 MG/DL (ref 0.51–0.95)
DEPRECATED HCO3 PLAS-SCNC: 20 MMOL/L (ref 22–29)
EGFRCR SERPLBLD CKD-EPI 2021: >90 ML/MIN/1.73M2
FASTING STATUS PATIENT QL REPORTED: ABNORMAL
GLUCOSE SERPL-MCNC: 162 MG/DL (ref 70–99)
HDLC SERPL-MCNC: 43 MG/DL
LDLC SERPL CALC-MCNC: 57 MG/DL
NONHDLC SERPL-MCNC: 92 MG/DL
POTASSIUM SERPL-SCNC: 3.8 MMOL/L (ref 3.4–5.3)
PROT SERPL-MCNC: 7.3 G/DL (ref 6.4–8.3)
SODIUM SERPL-SCNC: 137 MMOL/L (ref 135–145)
TRIGL SERPL-MCNC: 173 MG/DL

## 2024-06-29 ENCOUNTER — HEALTH MAINTENANCE LETTER (OUTPATIENT)
Age: 44
End: 2024-06-29

## 2024-09-07 ENCOUNTER — HEALTH MAINTENANCE LETTER (OUTPATIENT)
Age: 44
End: 2024-09-07

## 2024-11-16 ENCOUNTER — HEALTH MAINTENANCE LETTER (OUTPATIENT)
Age: 44
End: 2024-11-16

## 2025-03-08 ENCOUNTER — HEALTH MAINTENANCE LETTER (OUTPATIENT)
Age: 45
End: 2025-03-08

## 2025-03-19 ENCOUNTER — LAB REQUISITION (OUTPATIENT)
Dept: LAB | Facility: CLINIC | Age: 45
End: 2025-03-19

## 2025-03-19 DIAGNOSIS — E11.9 TYPE 2 DIABETES MELLITUS WITHOUT COMPLICATIONS (H): ICD-10-CM

## 2025-03-19 PROCEDURE — 80053 COMPREHEN METABOLIC PANEL: CPT | Performed by: PHYSICIAN ASSISTANT

## 2025-03-19 PROCEDURE — 82043 UR ALBUMIN QUANTITATIVE: CPT | Performed by: PHYSICIAN ASSISTANT

## 2025-03-19 PROCEDURE — 82570 ASSAY OF URINE CREATININE: CPT | Performed by: PHYSICIAN ASSISTANT

## 2025-03-19 PROCEDURE — 80061 LIPID PANEL: CPT | Performed by: PHYSICIAN ASSISTANT

## 2025-03-20 LAB
ALBUMIN SERPL BCG-MCNC: 3.9 G/DL (ref 3.5–5.2)
ALP SERPL-CCNC: 176 U/L (ref 40–150)
ALT SERPL W P-5'-P-CCNC: 22 U/L (ref 0–50)
ANION GAP SERPL CALCULATED.3IONS-SCNC: 13 MMOL/L (ref 7–15)
AST SERPL W P-5'-P-CCNC: 18 U/L (ref 0–45)
BILIRUB SERPL-MCNC: 0.4 MG/DL
BUN SERPL-MCNC: 11.6 MG/DL (ref 6–20)
CALCIUM SERPL-MCNC: 9.7 MG/DL (ref 8.8–10.4)
CHLORIDE SERPL-SCNC: 100 MMOL/L (ref 98–107)
CHOLEST SERPL-MCNC: 113 MG/DL
CREAT SERPL-MCNC: 0.87 MG/DL (ref 0.51–0.95)
CREAT UR-MCNC: 270 MG/DL
EGFRCR SERPLBLD CKD-EPI 2021: 85 ML/MIN/1.73M2
FASTING STATUS PATIENT QL REPORTED: ABNORMAL
FASTING STATUS PATIENT QL REPORTED: ABNORMAL
GLUCOSE SERPL-MCNC: 219 MG/DL (ref 70–99)
HCO3 SERPL-SCNC: 23 MMOL/L (ref 22–29)
HDLC SERPL-MCNC: 39 MG/DL
LDLC SERPL CALC-MCNC: 40 MG/DL
MICROALBUMIN UR-MCNC: 40.8 MG/L
MICROALBUMIN/CREAT UR: 15.11 MG/G CR (ref 0–25)
NONHDLC SERPL-MCNC: 74 MG/DL
POTASSIUM SERPL-SCNC: 4.8 MMOL/L (ref 3.4–5.3)
PROT SERPL-MCNC: 7.9 G/DL (ref 6.4–8.3)
SODIUM SERPL-SCNC: 136 MMOL/L (ref 135–145)
TRIGL SERPL-MCNC: 170 MG/DL

## 2025-06-22 ENCOUNTER — HEALTH MAINTENANCE LETTER (OUTPATIENT)
Age: 45
End: 2025-06-22